# Patient Record
Sex: MALE | Race: WHITE | NOT HISPANIC OR LATINO | Employment: OTHER | URBAN - METROPOLITAN AREA
[De-identification: names, ages, dates, MRNs, and addresses within clinical notes are randomized per-mention and may not be internally consistent; named-entity substitution may affect disease eponyms.]

---
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
## 2022-02-09 ENCOUNTER — OFFICE VISIT (OUTPATIENT)
Dept: FAMILY MEDICINE CLINIC | Facility: CLINIC | Age: 22
End: 2022-02-09

## 2022-02-09 VITALS
OXYGEN SATURATION: 98 % | TEMPERATURE: 97.1 F | HEART RATE: 110 BPM | WEIGHT: 315 LBS | BODY MASS INDEX: 45.1 KG/M2 | HEIGHT: 70 IN | DIASTOLIC BLOOD PRESSURE: 80 MMHG | SYSTOLIC BLOOD PRESSURE: 120 MMHG

## 2022-02-09 DIAGNOSIS — F33.1 MODERATE EPISODE OF RECURRENT MAJOR DEPRESSIVE DISORDER: Primary | ICD-10-CM

## 2022-02-09 DIAGNOSIS — F43.10 PTSD (POST-TRAUMATIC STRESS DISORDER): ICD-10-CM

## 2022-02-09 DIAGNOSIS — F90.2 ATTENTION DEFICIT HYPERACTIVITY DISORDER (ADHD), COMBINED TYPE: ICD-10-CM

## 2022-02-09 DIAGNOSIS — F31.9 BIPOLAR I DISORDER WITH DEPRESSION: ICD-10-CM

## 2022-02-09 DIAGNOSIS — Z71.6 ENCOUNTER FOR SMOKING CESSATION COUNSELING: ICD-10-CM

## 2022-02-09 PROCEDURE — 99406 BEHAV CHNG SMOKING 3-10 MIN: CPT | Performed by: FAMILY MEDICINE

## 2022-02-09 PROCEDURE — 99214 OFFICE O/P EST MOD 30 MIN: CPT | Performed by: FAMILY MEDICINE

## 2022-02-09 RX ORDER — BUPROPION HYDROCHLORIDE 150 MG/1
150 TABLET ORAL DAILY
Qty: 30 TABLET | Refills: 5 | Status: SHIPPED | OUTPATIENT
Start: 2022-02-09 | End: 2022-07-08 | Stop reason: SDUPTHER

## 2022-02-09 RX ORDER — NICOTINE 21 MG/24HR
1 PATCH, TRANSDERMAL 24 HOURS TRANSDERMAL EVERY 24 HOURS
Qty: 28 PATCH | Refills: 0 | Status: SHIPPED | OUTPATIENT
Start: 2022-02-09 | End: 2022-04-06 | Stop reason: SDUPTHER

## 2022-02-09 NOTE — PROGRESS NOTES
"Toyin Workman is a 21 y.o. male who presents today for Manic Behavior (med refill )      Toyin reports that he has not been taking his Vreylar. He is currently out of his medication, and took his last dose 2-3 weeks ago.   He says that while he was taking it, it made him very fatigued and he would sleep for 15+ hours at a time; therefore, he is not able to say if it improved his symptoms because he spent most of the time sleeping.  Since being off his medication, he has been sleeping 6-8 hours on average.  He reports that he has been experiencing increased depression since running out of his medication.  He says he has felt \"down in the dumps\", has been preferring to isolate, and experiences anhedonia. He has taken multiple medications in the past and is not sure what all they were.  He states that he is currently searching for employment.  Patient reports that he has been on Seroquel in the past and says that it has helped with his symptoms without making him overly tired.  Denies SI/HI. Is inquiring about restarting his Adderall.  He reports that he has had difficulty focusing and completing tasks.  He has not taken ADHD medications for the past year since moving, but says he was on medications all through adolescence.  Noted that patient speech is slowed throughout the course of interview.       Review of Systems   Constitutional: Positive for activity change and fatigue. Negative for appetite change, unexpected weight gain and unexpected weight loss.   HENT: Positive for congestion. Negative for mouth sores, postnasal drip, rhinorrhea, sinus pressure and sore throat.    Eyes: Negative for blurred vision, double vision, pain and visual disturbance.   Respiratory: Negative for cough, chest tightness and shortness of breath.    Cardiovascular: Negative for chest pain and palpitations.   Gastrointestinal: Negative for abdominal pain, constipation, diarrhea, nausea and vomiting.   Neurological: Negative for dizziness, " syncope, speech difficulty, weakness, light-headedness, headache, memory problem and confusion.   Psychiatric/Behavioral: Positive for agitation, decreased concentration, sleep disturbance and depressed mood. Negative for behavioral problems, dysphoric mood, hallucinations, self-injury, suicidal ideas, negative for hyperactivity and stress. The patient is not nervous/anxious.       PHQ-2/PHQ-9 Depression Screening 2/9/2022   Little interest or pleasure in doing things 1   Feeling down, depressed, or hopeless 0   Trouble falling or staying asleep, or sleeping too much 0   Feeling tired or having little energy 1   Poor appetite or overeating 0   Feeling bad about yourself - or that you are a failure or have let yourself or your family down 0   Trouble concentrating on things, such as reading the newspaper or watching television 3   Moving or speaking so slowly that other people could have noticed. Or the opposite - being so fidgety or restless that you have been moving around a lot more than usual 0   Thoughts that you would be better off dead, or of hurting yourself in some way 0   Total Score 5   If you checked off any problems, how difficult have these problems made it for you to do your work, take care of things at home, or get along with other people? Somewhat difficult       The following portions of the patient's history were reviewed and updated as appropriate: allergies, current medications, past family history, past medical history, past social history, past surgical history and problem list.    Current Outpatient Medications on File Prior to Visit   Medication Sig Dispense Refill   • [DISCONTINUED] Cariprazine HCl (Vraylar) 3 MG capsule capsule Take 1 capsule by mouth Daily. 30 capsule 3     No current facility-administered medications on file prior to visit.       Allergies   Allergen Reactions   • Liquimat Lotion [Elemental Sulfur] Itching        Visit Vitals  /80   Pulse 110   Temp 97.1 °F (36.2  "°C)   Ht 179 cm (70.47\")   Wt (!) 146 kg (322 lb)   SpO2 98%   BMI 45.59 kg/m²        Physical Exam  Eyes:      General: No scleral icterus.     Extraocular Movements: Extraocular movements intact.      Conjunctiva/sclera: Conjunctivae normal.      Pupils: Pupils are equal, round, and reactive to light.   Cardiovascular:      Rate and Rhythm: Normal rate and regular rhythm.      Pulses: Normal pulses.      Heart sounds: Normal heart sounds. No murmur heard.  No friction rub. No gallop.    Pulmonary:      Effort: Pulmonary effort is normal. No respiratory distress.      Breath sounds: Normal breath sounds. No wheezing or rales.   Neurological:      General: No focal deficit present.      Mental Status: He is oriented to person, place, and time.   Psychiatric:         Mood and Affect: Mood normal.         Behavior: Behavior normal.         Thought Content: Thought content normal.         Judgment: Judgment normal.          No results found for this or any previous visit.     Problems Addressed this Visit        Mental Health    ADHD (attention deficit hyperactivity disorder)    Relevant Medications    buPROPion XL (WELLBUTRIN XL) 150 MG 24 hr tablet    nicotine (Nicoderm CQ) 21 MG/24HR patch    nicotine polacrilex (Nicorette) 2 MG gum    Other Relevant Orders    Ambulatory Referral to Behavioral Health    PTSD (post-traumatic stress disorder)    Relevant Medications    buPROPion XL (WELLBUTRIN XL) 150 MG 24 hr tablet    nicotine (Nicoderm CQ) 21 MG/24HR patch    nicotine polacrilex (Nicorette) 2 MG gum    Other Relevant Orders    Ambulatory Referral to Behavioral Health    Depression - Primary     Patient is bipolar and depression are chronic and poorly controlled.  Patient was taking Vraylar but had excessive sleepiness and fatigue secondary to the medication so this was discontinued.  Patient will be started on Wellbutrin which may help with depression, bipolar, ADHD, and smoking cessation.  Patient was also given " referral to behavioral health for further evaluation and treatment.  He was encouraged to set up his MyChart to complete behavioral health appointments for counseling and med management.  RTC/ED precautions given.         Relevant Medications    buPROPion XL (WELLBUTRIN XL) 150 MG 24 hr tablet    nicotine (Nicoderm CQ) 21 MG/24HR patch    nicotine polacrilex (Nicorette) 2 MG gum    Other Relevant Orders    Ambulatory Referral to Behavioral Health    Bipolar I disorder with depression (HCC)    Relevant Medications    buPROPion XL (WELLBUTRIN XL) 150 MG 24 hr tablet    nicotine (Nicoderm CQ) 21 MG/24HR patch    nicotine polacrilex (Nicorette) 2 MG gum    Other Relevant Orders    Ambulatory Referral to Behavioral Health       Tobacco    Encounter for smoking cessation counseling     Toyin Workman  reports that he has been smoking cigarettes. He has a 0.50 pack-year smoking history. He has never used smokeless tobacco.. I have educated him on the risk of diseases from using tobacco products such as cancer, COPD, heart disease and cataracts.     I advised him to quit and he is willing to quit. We have discussed the following method/s for tobacco cessation:  Counseling [place] OTC Cessation Products Prescription Medicaiton.  Together we have set a quit date for 1 month from today.  He will follow up with me in 2 months or sooner to check on his progress.    I spent 5.5 minutes counseling the patient.                Relevant Medications    nicotine (Nicoderm CQ) 21 MG/24HR patch    nicotine polacrilex (Nicorette) 2 MG gum      Diagnoses       Codes Comments    Moderate episode of recurrent major depressive disorder (HCC)    -  Primary ICD-10-CM: F33.1  ICD-9-CM: 296.32     Encounter for smoking cessation counseling     ICD-10-CM: Z71.6  ICD-9-CM: V65.42, 305.1     Attention deficit hyperactivity disorder (ADHD), combined type     ICD-10-CM: F90.2  ICD-9-CM: 314.01     PTSD (post-traumatic stress disorder)     ICD-10-CM:  F43.10  ICD-9-CM: 309.81     Bipolar I disorder with depression (HCC)     ICD-10-CM: F31.9  ICD-9-CM: 296.50           Return in about 8 weeks (around 4/6/2022) for Follow-up ADHD, bipolar depression.    I attest that I have reviewed the student note and that the components of the history of the present illness, the physical exam, and the assessment and plan documented were performed by me or were performed in my presence by the student and verified by me.    Ruben Sharma MD   2/13/2022

## 2022-02-13 PROBLEM — Z71.6 ENCOUNTER FOR SMOKING CESSATION COUNSELING: Status: ACTIVE | Noted: 2022-02-13

## 2022-02-13 NOTE — ASSESSMENT & PLAN NOTE
Toyin Workman  reports that he has been smoking cigarettes. He has a 0.50 pack-year smoking history. He has never used smokeless tobacco.. I have educated him on the risk of diseases from using tobacco products such as cancer, COPD, heart disease and cataracts.     I advised him to quit and he is willing to quit. We have discussed the following method/s for tobacco cessation:  Counseling [place] OTC Cessation Products Prescription Medicaiton.  Together we have set a quit date for 1 month from today.  He will follow up with me in 2 months or sooner to check on his progress.    I spent 5.5 minutes counseling the patient.

## 2022-02-13 NOTE — ASSESSMENT & PLAN NOTE
Patient is bipolar and depression are chronic and poorly controlled.  Patient was taking Vraylar but had excessive sleepiness and fatigue secondary to the medication so this was discontinued.  Patient will be started on Wellbutrin which may help with depression, bipolar, ADHD, and smoking cessation.  Patient was also given referral to behavioral health for further evaluation and treatment.  He was encouraged to set up his MyChart to complete behavioral health appointments for counseling and med management.  RTC/ED precautions given.

## 2022-04-01 ENCOUNTER — TELEPHONE (OUTPATIENT)
Dept: FAMILY MEDICINE CLINIC | Facility: CLINIC | Age: 22
End: 2022-04-01

## 2022-04-01 NOTE — TELEPHONE ENCOUNTER
LVM for pt mother to call back      OKAY FOR HUB TO READY  We do not have any opening today, pt could try [facility]

## 2022-04-01 NOTE — TELEPHONE ENCOUNTER
Caller: jacqui kumari    Relationship: Mother    Best call back number: [phone]     What was the call regarding: PATIENT'S MOTHER CALLED TO MAKE AN APPOINTMENT.  PATIENT IS HAVING CONGESTION, SORE THROAT AND CHEST PAIN, NOT LIFE THREATENING.  SHE WOULD LIKE THE PATIENT TO BE SEEN TODAY.    Do you require a callback: YES

## 2022-04-06 ENCOUNTER — OFFICE VISIT (OUTPATIENT)
Dept: FAMILY MEDICINE CLINIC | Facility: CLINIC | Age: 22
End: 2022-04-06

## 2022-04-06 VITALS
TEMPERATURE: 98.7 F | HEART RATE: 118 BPM | OXYGEN SATURATION: 98 % | SYSTOLIC BLOOD PRESSURE: 130 MMHG | BODY MASS INDEX: 42.66 KG/M2 | HEIGHT: 72 IN | DIASTOLIC BLOOD PRESSURE: 80 MMHG | WEIGHT: 315 LBS

## 2022-04-06 DIAGNOSIS — Z71.6 ENCOUNTER FOR SMOKING CESSATION COUNSELING: ICD-10-CM

## 2022-04-06 DIAGNOSIS — F51.04 PSYCHOPHYSIOLOGICAL INSOMNIA: ICD-10-CM

## 2022-04-06 DIAGNOSIS — F33.41 RECURRENT MAJOR DEPRESSIVE DISORDER, IN PARTIAL REMISSION: ICD-10-CM

## 2022-04-06 DIAGNOSIS — F90.2 ATTENTION DEFICIT HYPERACTIVITY DISORDER (ADHD), COMBINED TYPE: ICD-10-CM

## 2022-04-06 DIAGNOSIS — F31.9 BIPOLAR I DISORDER WITH DEPRESSION: Primary | ICD-10-CM

## 2022-04-06 PROCEDURE — 99214 OFFICE O/P EST MOD 30 MIN: CPT | Performed by: FAMILY MEDICINE

## 2022-04-06 RX ORDER — HYDROXYZINE HYDROCHLORIDE 25 MG/1
25 TABLET, FILM COATED ORAL
Qty: 30 TABLET | Refills: 5 | Status: SHIPPED | OUTPATIENT
Start: 2022-04-06 | End: 2022-07-08 | Stop reason: SDUPTHER

## 2022-04-06 RX ORDER — NICOTINE 21 MG/24HR
1 PATCH, TRANSDERMAL 24 HOURS TRANSDERMAL EVERY 24 HOURS
Qty: 28 PATCH | Refills: 0 | Status: SHIPPED | OUTPATIENT
Start: 2022-04-06 | End: 2022-07-08

## 2022-04-06 NOTE — ASSESSMENT & PLAN NOTE
Toyin Workman  reports that he has been smoking cigarettes. He has a 0.50 pack-year smoking history. He has never used smokeless tobacco.. I have educated him on the risk of diseases from using tobacco products such as cancer, COPD, heart disease and cataracts.     I advised him to quit and he is willing to quit. We have discussed the following method/s for tobacco cessation:  Counseling OTC Cessation Products.  Together we have set a quit date for 1 month from today.  He will follow up with me in 3 months or sooner to check on his progress.    I spent 5 minutes counseling the patient.

## 2022-04-06 NOTE — ASSESSMENT & PLAN NOTE
Psychological condition is improving with treatment.  Continue current treatment regimen.  Referral to psychological counseling.  Referral to psychiatry.  Patient did not schedule appointment with behavioral health because he cannot set up his MyChart and we had the wrong phone number in his chart.  Patient will stop at the  incorrect phone number in the chart and get information to have his MyChart set up.  Psychological condition  will be reassessed in 3 months.

## 2022-04-06 NOTE — PROGRESS NOTES
Toyin Workman is a 21 y.o. male who presents today for Manic Behavior (F/u)      Patient is here for follow up on ADHD and depression. Patient is still taking the bupropion daily with no missed doses. He has not been contacted by the mental behavior health. He states they have been contacting his mom's phone instead of his number. Patient tried to call them back but he kept getting voicemail. He says his number [phone]. Patient's PHQ-2: 0. Patient states he is doing well on the bupropion. He states it has helped with the depression. It does not help with concentration but he has been completing daily tasks. Patient states he has issues with sleep. He has issues falling asleep and staying asleep. A few weeks ago, he was up for 2 days because he wasn't tired and then he will sleep 12+ hours after. He states his fatigue and irritability has improved. He denies SI/HI.    Patient is still smoking cigarettes and is using the nicotine patch daily. It does help with the cravings. He stopped smoking for 4-5 days. Patient is smoking 4-5 cigarettes daily.     PHQ-2/PHQ-9 Depression Screening 4/6/2022   Retired PHQ-9 Total Score -   Retired Total Score -   Little Interest or Pleasure in Doing Things 0-->not at all   Feeling Down, Depressed or Hopeless 0-->not at all   PHQ-9: Brief Depression Severity Measure Score 0       Review of Systems   Constitutional: Negative for diaphoresis and fever.   Respiratory: Negative for shortness of breath.    Cardiovascular: Negative for chest pain and palpitations.   Gastrointestinal: Negative for abdominal pain, blood in stool, constipation, diarrhea, nausea and vomiting.   Neurological: Negative for headache.   Psychiatric/Behavioral: Positive for agitation (mild and improving), decreased concentration and sleep disturbance. Negative for hallucinations, self-injury, suicidal ideas and depressed mood. The patient is not nervous/anxious.         The following portions of the patient's  "history were reviewed and updated as appropriate: allergies, current medications, past family history, past medical history, past social history, past surgical history and problem list.    Current Outpatient Medications on File Prior to Visit   Medication Sig Dispense Refill   • buPROPion XL (WELLBUTRIN XL) 150 MG 24 hr tablet Take 1 tablet by mouth Daily. 30 tablet 5   • [DISCONTINUED] nicotine (Nicoderm CQ) 21 MG/24HR patch Place 1 patch on the skin as directed by provider Daily. 28 patch 0   • [DISCONTINUED] nicotine polacrilex (Nicorette) 2 MG gum Chew 1 each As Needed for Smoking Cessation (every 2 hours as needed). 100 each 3     No current facility-administered medications on file prior to visit.       Allergies   Allergen Reactions   • Liquimat Lotion [Elemental Sulfur] Itching        Visit Vitals  /80   Pulse 118   Temp 98.7 °F (37.1 °C)   Ht 182.9 cm (72\")   Wt (!) 147 kg (325 lb)   SpO2 98%   BMI 44.08 kg/m²        Physical Exam  Constitutional:       Appearance: Normal appearance.   Cardiovascular:      Rate and Rhythm: Normal rate and regular rhythm.      Pulses: Normal pulses.      Heart sounds: Normal heart sounds. No murmur heard.    No friction rub. No gallop.   Pulmonary:      Effort: Pulmonary effort is normal. No respiratory distress.      Breath sounds: Normal breath sounds. No stridor. No wheezing, rhonchi or rales.   Chest:      Chest wall: No tenderness.   Skin:     General: Skin is warm and dry.   Neurological:      Mental Status: He is alert and oriented to person, place, and time.   Psychiatric:         Mood and Affect: Mood normal.         Behavior: Behavior normal.          No results found for this or any previous visit.     Problems Addressed this Visit        Mental Health    ADHD (attention deficit hyperactivity disorder)    Relevant Medications    hydrOXYzine (ATARAX) 25 MG tablet    nicotine (Nicoderm CQ) 21 MG/24HR patch    Other Relevant Orders    Ambulatory Referral to " Behavioral Health    Depression    Relevant Medications    hydrOXYzine (ATARAX) 25 MG tablet    nicotine (Nicoderm CQ) 21 MG/24HR patch    Other Relevant Orders    Ambulatory Referral to Behavioral Health    Bipolar I disorder with depression (HCC) - Primary     Psychological condition is improving with treatment.  Continue current treatment regimen.  Referral to psychological counseling.  Referral to psychiatry.  Patient did not schedule appointment with behavioral health because he cannot set up his MyChart and we had the wrong phone number in his chart.  Patient will stop at the  incorrect phone number in the chart and get information to have his MyChart set up.  Psychological condition  will be reassessed in 3 months.           Relevant Medications    hydrOXYzine (ATARAX) 25 MG tablet    nicotine (Nicoderm CQ) 21 MG/24HR patch    Other Relevant Orders    Ambulatory Referral to Behavioral Health       Sleep    Psychophysiological insomnia     Patient has had some difficulty sleeping as he does not feel sleepy and has racing thoughts when he tries to go to bed.  Patient has not had manic behavior that goes along with the sleep difficulty.  Patient has referral to behavioral health pending.  Patient will try hydroxyzine nightly for sleep.           Relevant Medications    hydrOXYzine (ATARAX) 25 MG tablet       Tobacco    Encounter for smoking cessation counseling     Toyin Workman  reports that he has been smoking cigarettes. He has a 0.50 pack-year smoking history. He has never used smokeless tobacco.. I have educated him on the risk of diseases from using tobacco products such as cancer, COPD, heart disease and cataracts.     I advised him to quit and he is willing to quit. We have discussed the following method/s for tobacco cessation:  Counseling OTC Cessation Products.  Together we have set a quit date for 1 month from today.  He will follow up with me in 3 months or sooner to check on his  progress.    I spent 5 minutes counseling the patient.                Relevant Medications    nicotine (Nicoderm CQ) 21 MG/24HR patch      Diagnoses       Codes Comments    Bipolar I disorder with depression (HCC)    -  Primary ICD-10-CM: F31.9  ICD-9-CM: 296.50     Attention deficit hyperactivity disorder (ADHD), combined type     ICD-10-CM: F90.2  ICD-9-CM: 314.01     Recurrent major depressive disorder, in partial remission (HCC)     ICD-10-CM: F33.41  ICD-9-CM: 296.35     Encounter for smoking cessation counseling     ICD-10-CM: Z71.6  ICD-9-CM: V65.42, 305.1     Psychophysiological insomnia     ICD-10-CM: F51.04  ICD-9-CM: 307.42           Return in about 3 months (around 7/6/2022) for Follow-up mental health and smoking.    I attest that I have reviewed the student note and that the components of the history of the present illness, the physical exam, and the assessment and plan documented were performed by me or were performed in my presence by the student and verified by me.    Ruben Sharma MD   4/6/2022

## 2022-04-06 NOTE — ASSESSMENT & PLAN NOTE
Patient has had some difficulty sleeping as he does not feel sleepy and has racing thoughts when he tries to go to bed.  Patient has not had manic behavior that goes along with the sleep difficulty.  Patient has referral to behavioral health pending.  Patient will try hydroxyzine nightly for sleep.

## 2022-07-08 ENCOUNTER — OFFICE VISIT (OUTPATIENT)
Dept: FAMILY MEDICINE CLINIC | Facility: CLINIC | Age: 22
End: 2022-07-08

## 2022-07-08 VITALS
DIASTOLIC BLOOD PRESSURE: 68 MMHG | HEART RATE: 80 BPM | WEIGHT: 315 LBS | TEMPERATURE: 98.6 F | HEIGHT: 70 IN | OXYGEN SATURATION: 98 % | BODY MASS INDEX: 45.1 KG/M2 | SYSTOLIC BLOOD PRESSURE: 116 MMHG

## 2022-07-08 DIAGNOSIS — F31.9 BIPOLAR I DISORDER WITH DEPRESSION: Primary | ICD-10-CM

## 2022-07-08 DIAGNOSIS — Z71.6 ENCOUNTER FOR SMOKING CESSATION COUNSELING: ICD-10-CM

## 2022-07-08 DIAGNOSIS — F51.04 PSYCHOPHYSIOLOGICAL INSOMNIA: ICD-10-CM

## 2022-07-08 DIAGNOSIS — F33.1 MODERATE EPISODE OF RECURRENT MAJOR DEPRESSIVE DISORDER: ICD-10-CM

## 2022-07-08 DIAGNOSIS — F90.2 ATTENTION DEFICIT HYPERACTIVITY DISORDER (ADHD), COMBINED TYPE: ICD-10-CM

## 2022-07-08 PROCEDURE — 99214 OFFICE O/P EST MOD 30 MIN: CPT | Performed by: FAMILY MEDICINE

## 2022-07-08 PROCEDURE — 99406 BEHAV CHNG SMOKING 3-10 MIN: CPT | Performed by: FAMILY MEDICINE

## 2022-07-08 RX ORDER — BUPROPION HYDROCHLORIDE 150 MG/1
150 TABLET ORAL DAILY
Qty: 30 TABLET | Refills: 5 | Status: SHIPPED | OUTPATIENT
Start: 2022-07-08 | End: 2022-12-07 | Stop reason: SDUPTHER

## 2022-07-08 RX ORDER — HYDROXYZINE HYDROCHLORIDE 25 MG/1
25 TABLET, FILM COATED ORAL
Qty: 30 TABLET | Refills: 5 | Status: SHIPPED | OUTPATIENT
Start: 2022-07-08 | End: 2022-12-07

## 2022-07-08 RX ORDER — NICOTINE 21 MG/24HR
1 PATCH, TRANSDERMAL 24 HOURS TRANSDERMAL EVERY 24 HOURS
Qty: 28 PATCH | Refills: 2 | Status: SHIPPED | OUTPATIENT
Start: 2022-07-08 | End: 2022-12-07

## 2022-07-08 NOTE — ASSESSMENT & PLAN NOTE
Sleep is improved significantly with hydroxyzine nightly.  Patient will continue this medication at this time.

## 2022-07-08 NOTE — ASSESSMENT & PLAN NOTE
Toyin Workman  reports that he has been smoking cigarettes. He has a 0.50 pack-year smoking history. He has never used smokeless tobacco.. I have educated him on the risk of diseases from using tobacco products such as cancer, COPD, heart disease and cataracts.     I advised him to quit and he is willing to quit. We have discussed the following method/s for tobacco cessation:  Education Material Counseling OTC Cessation Products.  Together we have set a quit date for 1 month from today.  He will follow up with me in 3 months or sooner to check on his progress.    I spent 5.5 minutes counseling the patient.

## 2022-07-08 NOTE — PROGRESS NOTES
Toyin Workman is a 22 y.o. male who presents today for Manic Behavior (F/u)      Patient has been taking wellbutrin as directed and has seen improvement in mood. He has not had further manic episodes. He has been using hydroxyzine for sleep which has helped significantly. He gets between 8-10 hours of sleep a night. He has appointment with behavioral health on 22. He needs paper for the  office stating he is a patient here and has his name and  on it. He continues to smoke in stressfeul situations and has been smoking about 8-10 cigarettes a day. He is using nicotine patches using a 21mg patch daily. He is not using nicotine gum due to the taste.        Review of Systems   Constitutional: Negative for fever and unexpected weight loss.   HENT: Negative for congestion, ear pain and sore throat.    Eyes: Negative for visual disturbance.   Respiratory: Negative for cough, shortness of breath and wheezing.    Cardiovascular: Negative for chest pain and palpitations.   Gastrointestinal: Negative for abdominal pain, blood in stool, constipation, diarrhea, nausea, vomiting and GERD.   Endocrine: Negative for polydipsia and polyuria.   Genitourinary: Negative for difficulty urinating.   Musculoskeletal: Negative for joint swelling.   Skin: Negative for rash and skin lesions.   Allergic/Immunologic: Negative for environmental allergies.   Neurological: Negative for seizures and syncope.   Hematological: Does not bruise/bleed easily.   Psychiatric/Behavioral: Negative for decreased concentration, dysphoric mood, sleep disturbance, suicidal ideas and depressed mood. The patient is not nervous/anxious.         The following portions of the patient's history were reviewed and updated as appropriate: allergies, current medications, past family history, past medical history, past social history, past surgical history and problem list.    Current Outpatient Medications on File Prior to Visit   Medication Sig Dispense Refill  "  • [DISCONTINUED] buPROPion XL (WELLBUTRIN XL) 150 MG 24 hr tablet Take 1 tablet by mouth Daily. 30 tablet 5   • [DISCONTINUED] hydrOXYzine (ATARAX) 25 MG tablet Take 1 tablet by mouth every night at bedtime. 30 tablet 5   • [DISCONTINUED] nicotine (Nicoderm CQ) 21 MG/24HR patch Place 1 patch on the skin as directed by provider Daily. 28 patch 0     No current facility-administered medications on file prior to visit.       Allergies   Allergen Reactions   • Liquimat Lotion [Elemental Sulfur] Itching        Visit Vitals  /68   Pulse 80   Temp 98.6 °F (37 °C)   Ht 179 cm (70.47\")   Wt (!) 143 kg (316 lb)   SpO2 98%   BMI 44.74 kg/m²        Physical Exam  Constitutional:       General: He is not in acute distress.     Appearance: He is well-developed. He is not diaphoretic.   HENT:      Head: Atraumatic.   Cardiovascular:      Rate and Rhythm: Normal rate and regular rhythm.      Heart sounds: Normal heart sounds. No murmur heard.    No friction rub. No gallop.   Pulmonary:      Effort: Pulmonary effort is normal. No respiratory distress.      Breath sounds: Normal breath sounds. No stridor. No wheezing, rhonchi or rales.   Musculoskeletal:      Cervical back: Normal range of motion and neck supple.   Skin:     General: Skin is warm and dry.   Neurological:      Mental Status: He is alert and oriented to person, place, and time.   Psychiatric:         Behavior: Behavior normal.          No results found for this or any previous visit.     Problems Addressed this Visit        Mental Health    ADHD (attention deficit hyperactivity disorder)    Relevant Medications    buPROPion XL (WELLBUTRIN XL) 150 MG 24 hr tablet    hydrOXYzine (ATARAX) 25 MG tablet    nicotine (Nicoderm CQ) 14 MG/24HR patch    Depression    Relevant Medications    buPROPion XL (WELLBUTRIN XL) 150 MG 24 hr tablet    hydrOXYzine (ATARAX) 25 MG tablet    nicotine (Nicoderm CQ) 14 MG/24HR patch    Bipolar I disorder with depression (HCC) - Primary "     Psychological condition is improving with treatment.  Continue current treatment regimen.  Patient was encouraged to keep his behavioral health appointment in August.  Psychological condition  will be reassessed in 3 months.           Relevant Medications    buPROPion XL (WELLBUTRIN XL) 150 MG 24 hr tablet    hydrOXYzine (ATARAX) 25 MG tablet    nicotine (Nicoderm CQ) 14 MG/24HR patch       Sleep    Psychophysiological insomnia     Sleep is improved significantly with hydroxyzine nightly.  Patient will continue this medication at this time.           Relevant Medications    hydrOXYzine (ATARAX) 25 MG tablet       Tobacco    Encounter for smoking cessation counseling     Toyin Workman  reports that he has been smoking cigarettes. He has a 0.50 pack-year smoking history. He has never used smokeless tobacco.. I have educated him on the risk of diseases from using tobacco products such as cancer, COPD, heart disease and cataracts.     I advised him to quit and he is willing to quit. We have discussed the following method/s for tobacco cessation:  Education Material Counseling OTC Cessation Products.  Together we have set a quit date for 1 month from today.  He will follow up with me in 3 months or sooner to check on his progress.    I spent 5.5 minutes counseling the patient.                  Relevant Medications    nicotine (Nicoderm CQ) 14 MG/24HR patch      Diagnoses       Codes Comments    Bipolar I disorder with depression (HCC)    -  Primary ICD-10-CM: F31.9  ICD-9-CM: 296.50     Attention deficit hyperactivity disorder (ADHD), combined type     ICD-10-CM: F90.2  ICD-9-CM: 314.01     Moderate episode of recurrent major depressive disorder (HCC)     ICD-10-CM: F33.1  ICD-9-CM: 296.32     Psychophysiological insomnia     ICD-10-CM: F51.04  ICD-9-CM: 307.42     Encounter for smoking cessation counseling     ICD-10-CM: Z71.6  ICD-9-CM: V65.42, 305.1           Return in about 3 months (around 10/8/2022) for  Annual.    Ruben Sharma MD   7/8/2022

## 2022-07-08 NOTE — ASSESSMENT & PLAN NOTE
Psychological condition is improving with treatment.  Continue current treatment regimen.  Patient was encouraged to keep his behavioral health appointment in August.  Psychological condition  will be reassessed in 3 months.

## 2022-07-08 NOTE — PATIENT INSTRUCTIONS
Smoking Tobacco Information, Adult  Smoking tobacco can be harmful to your health. Tobacco contains a poisonous (toxic), colorless chemical called nicotine. Nicotine is addictive. It changes the brain and can make it hard to stop smoking. Tobacco also has other toxic chemicals that can hurt your body and raise your risk of many cancers.  How can smoking tobacco affect me?  Smoking tobacco puts you at risk for:  Cancer. Smoking is most commonly associated with lung cancer, but can also lead to cancer in other parts of the body.  Chronic obstructive pulmonary disease (COPD). This is a long-term lung condition that makes it hard to breathe. It also gets worse over time.  High blood pressure (hypertension), heart disease, stroke, or heart attack.  Lung infections, such as pneumonia.  Cataracts. This is when the lenses in the eyes become clouded.  Digestive problems. This may include peptic ulcers, heartburn, and gastroesophageal reflux disease (GERD).  Oral health problems, such as gum disease and tooth loss.  Loss of taste and smell.  Smoking can affect your appearance by causing:  Wrinkles.  Yellow or stained teeth, fingers, and fingernails.  Smoking tobacco can also affect your social life, because:  It may be challenging to find places to smoke when away from home. Many workplaces, restaurants, hotels, and public places are tobacco-free.  Smoking is expensive. This is due to the cost of tobacco and the long-term costs of treating health problems from smoking.  Secondhand smoke may affect those around you. Secondhand smoke can cause lung cancer, breathing problems, and heart disease. Children of smokers have a higher risk for:  Sudden infant death syndrome (SIDS).  Ear infections.  Lung infections.  If you currently smoke tobacco, quitting now can help you:  Lead a longer and healthier life.  Look, smell, breathe, and feel better over time.  Save money.  Protect others from the harms of secondhand smoke.  What  actions can I take to prevent health problems?  Quit smoking    Do not start smoking. Quit if you already do.  Make a plan to quit smoking and commit to it. Look for programs to help you and ask your health care provider for recommendations and ideas.  Set a date and write down all the reasons you want to quit.  Let your friends and family know you are quitting so they can help and support you. Consider finding friends who also want to quit. It can be easier to quit with someone else, so that you can support each other.  Talk with your health care provider about using nicotine replacement medicines to help you quit, such as gum, lozenges, patches, sprays, or pills.  Do not replace cigarette smoking with electronic cigarettes, which are commonly called e-cigarettes. The safety of e-cigarettes is not known, and some may contain harmful chemicals.  If you try to quit but return to smoking, stay positive. It is common to slip up when you first quit, so take it one day at a time.  Be prepared for cravings. When you feel the urge to smoke, chew gum or suck on hard candy.    Lifestyle  Stay busy and take care of your body.  Drink enough fluid to keep your urine pale yellow.  Get plenty of exercise and eat a healthy diet. This can help prevent weight gain after quitting.  Monitor your eating habits. Quitting smoking can cause you to have a larger appetite than when you smoke.  Find ways to relax. Go out with friends or family to a movie or a restaurant where people do not smoke.  Ask your health care provider about having regular tests (screenings) to check for cancer. This may include blood tests, imaging tests, and other tests.  Find ways to manage your stress, such as meditation, yoga, or exercise.  Where to find support  To get support to quit smoking, consider:  Asking your health care provider for more information and resources.  Taking classes to learn more about quitting smoking.  Looking for local organizations  that offer resources about quitting smoking.  Joining a support group for people who want to quit smoking in your local community.  Calling the smokefree.gov counselor helpline: 1-800-Quit-Now (1-703.289.1270)  Where to find more information  You may find more information about quitting smoking from:  HelpGuide.org: www.helpguide.org  Smokefree.gov: smokefree.gov  American Lung Association: www.lung.org  Contact a health care provider if you:  Have problems breathing.  Notice that your lips, nose, or fingers turn blue.  Have chest pain.  Are coughing up blood.  Feel faint or you pass out.  Have other health changes that cause you to worry.  Summary  Smoking tobacco can negatively affect your health, the health of those around you, your finances, and your social life.  Do not start smoking. Quit if you already do. If you need help quitting, ask your health care provider.  Think about joining a support group for people who want to quit smoking in your local community. There are many effective programs that will help you to quit this behavior.  This information is not intended to replace advice given to you by your health care provider. Make sure you discuss any questions you have with your health care provider.  Document Revised: 09/11/2020 Document Reviewed: 01/02/2018  Elsevier Patient Education © 2021 [employer]

## 2022-10-11 ENCOUNTER — TELEPHONE (OUTPATIENT)
Dept: FAMILY MEDICINE CLINIC | Facility: CLINIC | Age: 22
End: 2022-10-11

## 2022-10-11 NOTE — TELEPHONE ENCOUNTER
Hub to read    Attempted to call pt at both #s no answer, left vm on [phone] area code about no show policy. [phone] # not a valid #. Letter sent out

## 2022-10-13 ENCOUNTER — TELEMEDICINE (OUTPATIENT)
Dept: PSYCHIATRY | Facility: CLINIC | Age: 22
End: 2022-10-13

## 2022-10-13 DIAGNOSIS — F51.04 PSYCHOPHYSIOLOGICAL INSOMNIA: ICD-10-CM

## 2022-10-13 DIAGNOSIS — F43.10 PTSD (POST-TRAUMATIC STRESS DISORDER): ICD-10-CM

## 2022-10-13 DIAGNOSIS — F90.2 ATTENTION DEFICIT HYPERACTIVITY DISORDER (ADHD), COMBINED TYPE: Primary | ICD-10-CM

## 2022-10-13 DIAGNOSIS — F31.9 BIPOLAR I DISORDER WITH DEPRESSION: ICD-10-CM

## 2022-10-13 PROCEDURE — 90792 PSYCH DIAG EVAL W/MED SRVCS: CPT | Performed by: NURSE PRACTITIONER
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
## 2022-12-07 ENCOUNTER — TELEMEDICINE (OUTPATIENT)
Dept: PSYCHIATRY | Facility: CLINIC | Age: 22
End: 2022-12-07

## 2022-12-07 DIAGNOSIS — F90.2 ATTENTION DEFICIT HYPERACTIVITY DISORDER (ADHD), COMBINED TYPE: ICD-10-CM

## 2022-12-07 DIAGNOSIS — F33.1 MODERATE EPISODE OF RECURRENT MAJOR DEPRESSIVE DISORDER: ICD-10-CM

## 2022-12-07 DIAGNOSIS — F51.04 PSYCHOPHYSIOLOGICAL INSOMNIA: ICD-10-CM

## 2022-12-07 DIAGNOSIS — F31.9 BIPOLAR I DISORDER WITH DEPRESSION: Primary | ICD-10-CM

## 2022-12-07 DIAGNOSIS — F43.10 PTSD (POST-TRAUMATIC STRESS DISORDER): ICD-10-CM

## 2022-12-07 PROCEDURE — 99214 OFFICE O/P EST MOD 30 MIN: CPT | Performed by: NURSE PRACTITIONER

## 2022-12-07 RX ORDER — DOXEPIN HYDROCHLORIDE 10 MG/1
10 CAPSULE ORAL NIGHTLY
Qty: 30 CAPSULE | Refills: 3 | Status: SHIPPED | OUTPATIENT
Start: 2022-12-07 | End: 2023-01-11 | Stop reason: SDUPTHER

## 2022-12-07 RX ORDER — BUPROPION HYDROCHLORIDE 150 MG/1
150 TABLET ORAL DAILY
Qty: 30 TABLET | Refills: 6 | Status: SHIPPED | OUTPATIENT
Start: 2022-12-07

## 2022-12-07 NOTE — PROGRESS NOTES
Patient Name: Toyin Workman  MRN: [id number]   :  2000     This provider is located at [facility] [address], [address] using a secure [employer] Video Visit through [employer]. Patient is being seen remotely via telehealth at their home address in [place], and stated they are in a secure environment for this session. The patient's condition being diagnosed/treated is appropriate for telemedicine. The provider identified herself as well as her credentials.   The patient, and/or patients guardian, consent to be seen remotely, and when consent is given they understand that the consent allows for patient identifiable information to be sent to a third party as needed.   They may refuse to be seen remotely at any time. The electronic data is encrypted and password protected, and the patient and/or guardian has been advised of the potential risks to privacy not withstanding such measures.    You have chosen to receive care through a telehealth visit.  Do you consent to use a video/audio connection for your medical care today? Yes    Chief Complaint:      ICD-10-CM ICD-9-CM   1. Bipolar I disorder with depression (HCC)  F31.9 296.50   2. PTSD (post-traumatic stress disorder)  F43.10 309.81   3. Psychophysiological insomnia  F51.04 307.42   4. Attention deficit hyperactivity disorder (ADHD), combined type  F90.2 314.01   5. Moderate episode of recurrent major depressive disorder (HCC)  F33.1 296.32       History of Present Illness: Toyin Workman is a 22 y.o. male is here today for medication management follow up.  Patient states his mood is much better.  It is more even.  Patient states his mother agrees.  Patient's mother states he is not as irritable and angry.  States the only issue is difficulty staying asleep.  States if he wakes up in the middle of the night then he is up and cannot go back to sleep.    The following portions of the  patient's history were reviewed and updated as appropriate: allergies, current medications, past family history, past medical history, past social history, past surgical history and problem list.    Review of Systems;;  Review of Systems   Constitutional: Negative for activity change, appetite change, fatigue, unexpected weight gain and unexpected weight loss.   Respiratory: Negative for shortness of breath and wheezing.    Gastrointestinal: Negative for constipation, diarrhea, nausea and vomiting.   Musculoskeletal: Negative for gait problem.   Skin: Negative for dry skin and rash.   Neurological: Negative for dizziness, speech difficulty, weakness, light-headedness, headache, memory problem and confusion.   Psychiatric/Behavioral: Positive for sleep disturbance. Negative for agitation, behavioral problems, decreased concentration, dysphoric mood, hallucinations, self-injury, suicidal ideas, negative for hyperactivity, depressed mood and stress. The patient is not nervous/anxious.        Physical Exam;;  Physical Exam  Constitutional:       General: He is not in acute distress.     Appearance: He is well-developed. He is not diaphoretic.   HENT:      Head: Normocephalic and atraumatic.   Eyes:      Conjunctiva/sclera: Conjunctivae normal.   Pulmonary:      Effort: Pulmonary effort is normal. No respiratory distress.   Musculoskeletal:         General: Normal range of motion.      Cervical back: Full passive range of motion without pain and normal range of motion.   Neurological:      Mental Status: He is alert and oriented to person, place, and time.   Psychiatric:         Mood and Affect: Mood is not anxious or depressed. Affect is not labile, blunt, angry or inappropriate.         Speech: Speech is not rapid and pressured or tangential.         Behavior: Behavior normal. Behavior is not agitated, slowed, aggressive, withdrawn, hyperactive or combative. Behavior is cooperative.         Thought Content: Thought  content normal. Thought content is not paranoid or delusional. Thought content does not include homicidal or suicidal ideation. Thought content does not include homicidal or suicidal plan.         Judgment: Judgment normal.       There were no vitals taken for this visit.  There is no height or weight on file to calculate BMI. Video appt unable to obtain.     Current Medications;;    Current Outpatient Medications:   •  buPROPion XL (WELLBUTRIN XL) 150 MG 24 hr tablet, Take 1 tablet by mouth Daily., Disp: 30 tablet, Rfl: 6  •  Cariprazine HCl (Vraylar) 3 MG capsule capsule, Take 1 capsule by mouth Daily., Disp: 30 capsule, Rfl: 6  •  doxepin (SINEquan) 10 MG capsule, Take 1 capsule by mouth Every Night., Disp: 30 capsule, Rfl: 3    Lab Results:   No visits with results within 3 Month(s) from this visit.   Latest known visit with results is:   No results found for any previous visit.       Mental Status Exam:   Hygiene:   good  Cooperation:  Cooperative  Eye Contact:  Good  Psychomotor Behavior:  Appropriate  Mood:within normal limits  Affect:  Appropriate  Hopelessness: Denies  Speech:  Normal  Thought Process:  Goal directed  Thought Content:  Normal  Suicidal:  None  Homicidal:  None  Hallucinations:  None  Delusion:  None  Memory:  Intact  Orientation:  Person, Place, Time and Situation  Reliability:  good  Insight:  Good  Judgement:  Good  Impulse Control:  Good  Physical/Medical Issues:  No     PHQ-9 Depression Screening  Little interest or pleasure in doing things?     Feeling down, depressed, or hopeless?     Trouble falling or staying asleep, or sleeping too much?     Feeling tired or having little energy?     Poor appetite or overeating?     Feeling bad about yourself - or that you are a failure or have let yourself or your family down?     Trouble concentrating on things, such as reading the newspaper or watching television?     Moving or speaking so slowly that other people could have noticed? Or the  opposite - being so fidgety or restless that you have been moving around a lot more than usual?     Thoughts that you would be better off dead, or of hurting yourself in some way?     PHQ-9 Total Score     If you checked off any problems, how difficult have these problems made it for you to do your work, take care of things at home, or get along with other people?          Assessment/Plan:  Diagnoses and all orders for this visit:    1. Bipolar I disorder with depression (HCC) (Primary)  -     buPROPion XL (WELLBUTRIN XL) 150 MG 24 hr tablet; Take 1 tablet by mouth Daily.  Dispense: 30 tablet; Refill: 6  -     Cariprazine HCl (Vraylar) 3 MG capsule capsule; Take 1 capsule by mouth Daily.  Dispense: 30 capsule; Refill: 6    2. PTSD (post-traumatic stress disorder)    3. Psychophysiological insomnia    4. Attention deficit hyperactivity disorder (ADHD), combined type  -     buPROPion XL (WELLBUTRIN XL) 150 MG 24 hr tablet; Take 1 tablet by mouth Daily.  Dispense: 30 tablet; Refill: 6    5. Moderate episode of recurrent major depressive disorder (HCC)  -     buPROPion XL (WELLBUTRIN XL) 150 MG 24 hr tablet; Take 1 tablet by mouth Daily.  Dispense: 30 tablet; Refill: 6    Other orders  -     doxepin (SINEquan) 10 MG capsule; Take 1 capsule by mouth Every Night.  Dispense: 30 capsule; Refill: 3      Patient's mood seems to be stable.  We will add doxepin 10 mg at night for sleep and assistance in staying asleep.    A psychological evaluation was conducted in order to assess past and current level of functioning. Areas assessed included, but were not limited to: perception of social support, perception of ability to face and deal with challenges in life (positive functioning), anxiety symptoms, depressive symptoms, perspective on beliefs/belief system, coping skills for stress, intelligence level,  Therapeutic rapport was established. Interventions conducted today were geared towards incorporating medication management  along with support for continued therapy. Education was also provided as to the med management with this provider and what to expect in subsequent sessions.    We discussed risks, benefits,goals and side effects of the above medication and the patient was agreeable with the plan.Patient was educated on the importance of compliance with treatment and follow-up appointments. Patient is aware to contact the [facility] [phone] with any worsening of symptoms. To call for questions or concerns and return early if necessary. Patent is agreeable to go to the Emergency Department or call 911 should they begin SI/HI.     Part of this note may be an electronic transcription/translation of spoken language to printed text using the Dragon Dictation System.    Return in about 5 weeks (around 1/11/2023).    Sherrie Ramirez APRN

## 2023-01-11 ENCOUNTER — TELEMEDICINE (OUTPATIENT)
Dept: PSYCHIATRY | Facility: CLINIC | Age: 23
End: 2023-01-11
Payer: MEDICAID

## 2023-01-11 DIAGNOSIS — F43.10 PTSD (POST-TRAUMATIC STRESS DISORDER): ICD-10-CM

## 2023-01-11 DIAGNOSIS — F90.2 ATTENTION DEFICIT HYPERACTIVITY DISORDER (ADHD), COMBINED TYPE: ICD-10-CM

## 2023-01-11 DIAGNOSIS — F51.04 PSYCHOPHYSIOLOGICAL INSOMNIA: ICD-10-CM

## 2023-01-11 DIAGNOSIS — F31.9 BIPOLAR I DISORDER WITH DEPRESSION: Primary | ICD-10-CM

## 2023-01-11 PROCEDURE — 99214 OFFICE O/P EST MOD 30 MIN: CPT | Performed by: NURSE PRACTITIONER

## 2023-01-11 RX ORDER — DOXEPIN HYDROCHLORIDE 10 MG/1
10 CAPSULE ORAL NIGHTLY
Qty: 30 CAPSULE | Refills: 3 | Status: SHIPPED | OUTPATIENT
Start: 2023-01-11

## 2023-01-11 NOTE — PROGRESS NOTES
Patient Name: Toyin Workman  MRN: [id number]   :  2000     This provider is located at the [facility] (through [facility]), [address], [address] using a secure [employer] Video Visit through [employer]. Patient is being seen remotely via telehealth at their home address in [place], and stated they are in a secure environment for this session. The patient's condition being diagnosed/treated is appropriate for telemedicine. The provider identified herself as well as her credentials.   The patient, and/or patients guardian, consent to be seen remotely, and when consent is given they understand that the consent allows for patient identifiable information to be sent to a third party as needed.   They may refuse to be seen remotely at any time. The electronic data is encrypted and password protected, and the patient and/or guardian has been advised of the potential risks to privacy not withstanding such measures.    You have chosen to receive care through a telehealth visit.  Do you consent to use a video/audio connection for your medical care today? Yes    Chief Complaint:      ICD-10-CM ICD-9-CM   1. Bipolar I disorder with depression (HCC)  F31.9 296.50   2. PTSD (post-traumatic stress disorder)  F43.10 309.81   3. Attention deficit hyperactivity disorder (ADHD), combined type  F90.2 314.01   4. Psychophysiological insomnia  F51.04 307.42       History of Present Illness: Toyin Workman is a 22 y.o. male is here today for medication management follow up.  Patient states mood is good and sleep is better.  Does note some anxiety.    The following portions of the patient's history were reviewed and updated as appropriate: allergies, current medications, past family history, past medical history, past social history, past surgical history and problem list.    Review of Systems;;  Review of Systems   Constitutional: Negative for activity change, appetite change, fatigue,  unexpected weight gain and unexpected weight loss.   Respiratory: Negative for shortness of breath and wheezing.    Gastrointestinal: Negative for constipation, diarrhea, nausea and vomiting.   Musculoskeletal: Negative for gait problem.   Skin: Negative for dry skin and rash.   Neurological: Negative for dizziness, speech difficulty, weakness, light-headedness, headache, memory problem and confusion.   Psychiatric/Behavioral: Positive for decreased concentration and stress. Negative for agitation, behavioral problems, dysphoric mood, hallucinations, self-injury, sleep disturbance, suicidal ideas, negative for hyperactivity and depressed mood. The patient is nervous/anxious.        Physical Exam;;  Physical Exam  Constitutional:       General: He is not in acute distress.     Appearance: He is well-developed. He is not diaphoretic.   HENT:      Head: Normocephalic and atraumatic.   Eyes:      Conjunctiva/sclera: Conjunctivae normal.   Pulmonary:      Effort: Pulmonary effort is normal. No respiratory distress.   Musculoskeletal:         General: Normal range of motion.      Cervical back: Full passive range of motion without pain and normal range of motion.   Neurological:      Mental Status: He is alert and oriented to person, place, and time.   Psychiatric:         Mood and Affect: Mood is anxious. Mood is not depressed. Affect is not labile, blunt, angry or inappropriate.         Speech: Speech is not rapid and pressured or tangential.         Behavior: Behavior normal. Behavior is not agitated, slowed, aggressive, withdrawn, hyperactive or combative. Behavior is cooperative.         Thought Content: Thought content normal. Thought content is not paranoid or delusional. Thought content does not include homicidal or suicidal ideation. Thought content does not include homicidal or suicidal plan.         Judgment: Judgment normal.       There were no vitals taken for this visit.  There is no height or weight on file  to calculate BMI. Video appt unable to obtain.     Current Medications;;    Current Outpatient Medications:   •  buPROPion XL (WELLBUTRIN XL) 150 MG 24 hr tablet, Take 1 tablet by mouth Daily., Disp: 30 tablet, Rfl: 6  •  Cariprazine HCl (Vraylar) 3 MG capsule capsule, Take 1 capsule by mouth Daily., Disp: 30 capsule, Rfl: 6  •  doxepin (SINEquan) 10 MG capsule, Take 1 capsule by mouth Every Night., Disp: 30 capsule, Rfl: 3    Lab Results:   No visits with results within 3 Month(s) from this visit.   Latest known visit with results is:   No results found for any previous visit.       Mental Status Exam:   Hygiene:   good  Cooperation:  Cooperative  Eye Contact:  Good  Psychomotor Behavior:  Appropriate  Mood:anxious  Affect:  Appropriate  Hopelessness: Denies  Speech:  Normal  Thought Process:  Goal directed  Thought Content:  Normal  Suicidal:  None  Homicidal:  None  Hallucinations:  None  Delusion:  None  Memory:  Intact  Orientation:  Person, Place, Time and Situation  Reliability:  good  Insight:  Good  Judgement:  Good  Impulse Control:  Good    PHQ-9 Depression Screening  Little interest or pleasure in doing things?     Feeling down, depressed, or hopeless?     Trouble falling or staying asleep, or sleeping too much?     Feeling tired or having little energy?     Poor appetite or overeating?     Feeling bad about yourself - or that you are a failure or have let yourself or your family down?     Trouble concentrating on things, such as reading the newspaper or watching television?     Moving or speaking so slowly that other people could have noticed? Or the opposite - being so fidgety or restless that you have been moving around a lot more than usual?     Thoughts that you would be better off dead, or of hurting yourself in some way?     PHQ-9 Total Score     If you checked off any problems, how difficult have these problems made it for you to do your work, take care of things at home, or get along with other  people?          Assessment/Plan:  Diagnoses and all orders for this visit:    1. Bipolar I disorder with depression (HCC) (Primary)    2. PTSD (post-traumatic stress disorder)    3. Attention deficit hyperactivity disorder (ADHD), combined type    4. Psychophysiological insomnia  -     doxepin (SINEquan) 10 MG capsule; Take 1 capsule by mouth Every Night.  Dispense: 30 capsule; Refill: 3      Encouraged patient to reconsider taking ADHD medicine as this may help his anxiety that he is having.  Patient states he would think about it.  We will continue other medications and follow-up in 2 months.    A psychological evaluation was conducted in order to assess past and current level of functioning. Areas assessed included, but were not limited to: perception of social support, perception of ability to face and deal with challenges in life (positive functioning), anxiety symptoms, depressive symptoms, perspective on beliefs/belief system, coping skills for stress, intelligence level,  Therapeutic rapport was established. Interventions conducted today were geared towards incorporating medication management along with support for continued therapy. Education was also provided as to the med management with this provider and what to expect in subsequent sessions.    We discussed risks, benefits,goals and side effects of the above medication and the patient was agreeable with the plan.Patient was educated on the importance of compliance with treatment and follow-up appointments. Patient is aware to contact the [facility] [phone] with any worsening of symptoms. To call for questions or concerns and return early if necessary. Patent is agreeable to go to the Emergency Department or call 911 should they begin SI/HI.     Part of this note may be an electronic transcription/translation of spoken language to printed text using the Dragon Dictation System.    Return in about 2 months (around  3/11/2023) for Follow Up 30 min, Video visit.    Sherrie Ramirez, APRN

## 2023-01-27 ENCOUNTER — TELEPHONE (OUTPATIENT)
Dept: FAMILY MEDICINE CLINIC | Facility: CLINIC | Age: 23
End: 2023-01-27
Payer: MEDICAID

## 2023-03-28 ENCOUNTER — LAB (OUTPATIENT)
Dept: LAB | Facility: HOSPITAL | Age: 23
End: 2023-03-28
Payer: MEDICAID

## 2023-03-28 ENCOUNTER — OFFICE VISIT (OUTPATIENT)
Dept: FAMILY MEDICINE CLINIC | Facility: CLINIC | Age: 23
End: 2023-03-28
Payer: MEDICAID

## 2023-03-28 VITALS
SYSTOLIC BLOOD PRESSURE: 150 MMHG | HEIGHT: 70 IN | HEART RATE: 97 BPM | WEIGHT: 297.6 LBS | TEMPERATURE: 98.6 F | BODY MASS INDEX: 42.61 KG/M2 | OXYGEN SATURATION: 100 % | DIASTOLIC BLOOD PRESSURE: 100 MMHG

## 2023-03-28 DIAGNOSIS — E66.01 CLASS 3 SEVERE OBESITY DUE TO EXCESS CALORIES WITHOUT SERIOUS COMORBIDITY WITH BODY MASS INDEX (BMI) OF 40.0 TO 44.9 IN ADULT: ICD-10-CM

## 2023-03-28 DIAGNOSIS — R55 SYNCOPE AND COLLAPSE: Primary | ICD-10-CM

## 2023-03-28 DIAGNOSIS — R03.0 ELEVATED BLOOD PRESSURE READING IN OFFICE WITHOUT DIAGNOSIS OF HYPERTENSION: ICD-10-CM

## 2023-03-28 DIAGNOSIS — R55 SYNCOPE AND COLLAPSE: ICD-10-CM

## 2023-03-28 PROBLEM — E66.813 CLASS 3 SEVERE OBESITY DUE TO EXCESS CALORIES WITHOUT SERIOUS COMORBIDITY WITH BODY MASS INDEX (BMI) OF 40.0 TO 44.9 IN ADULT: Status: ACTIVE | Noted: 2023-03-28

## 2023-03-28 LAB
BASOPHILS # BLD AUTO: 0.09 10*3/MM3 (ref 0–0.2)
BASOPHILS NFR BLD AUTO: 0.6 % (ref 0–1.5)
DEPRECATED RDW RBC AUTO: 40.9 FL (ref 37–54)
EOSINOPHIL # BLD AUTO: 0.75 10*3/MM3 (ref 0–0.4)
EOSINOPHIL NFR BLD AUTO: 5.4 % (ref 0.3–6.2)
ERYTHROCYTE [DISTWIDTH] IN BLOOD BY AUTOMATED COUNT: 13.3 % (ref 12.3–15.4)
HCT VFR BLD AUTO: 44.6 % (ref 37.5–51)
HGB BLD-MCNC: 15.1 G/DL (ref 13–17.7)
IMM GRANULOCYTES # BLD AUTO: 0.05 10*3/MM3 (ref 0–0.05)
IMM GRANULOCYTES NFR BLD AUTO: 0.4 % (ref 0–0.5)
LYMPHOCYTES # BLD AUTO: 4.34 10*3/MM3 (ref 0.7–3.1)
LYMPHOCYTES NFR BLD AUTO: 31.2 % (ref 19.6–45.3)
MCH RBC QN AUTO: 28.9 PG (ref 26.6–33)
MCHC RBC AUTO-ENTMCNC: 33.9 G/DL (ref 31.5–35.7)
MCV RBC AUTO: 85.4 FL (ref 79–97)
MONOCYTES # BLD AUTO: 1.26 10*3/MM3 (ref 0.1–0.9)
MONOCYTES NFR BLD AUTO: 9 % (ref 5–12)
NEUTROPHILS NFR BLD AUTO: 53.4 % (ref 42.7–76)
NEUTROPHILS NFR BLD AUTO: 7.44 10*3/MM3 (ref 1.7–7)
NRBC BLD AUTO-RTO: 0 /100 WBC (ref 0–0.2)
PLATELET # BLD AUTO: 386 10*3/MM3 (ref 140–450)
PMV BLD AUTO: 9.8 FL (ref 6–12)
RBC # BLD AUTO: 5.22 10*6/MM3 (ref 4.14–5.8)
WBC NRBC COR # BLD: 13.93 10*3/MM3 (ref 3.4–10.8)

## 2023-03-28 PROCEDURE — 80053 COMPREHEN METABOLIC PANEL: CPT

## 2023-03-28 PROCEDURE — 85025 COMPLETE CBC W/AUTO DIFF WBC: CPT

## 2023-03-28 PROCEDURE — 80061 LIPID PANEL: CPT

## 2023-03-28 PROCEDURE — 83036 HEMOGLOBIN GLYCOSYLATED A1C: CPT

## 2023-03-28 PROCEDURE — 84443 ASSAY THYROID STIM HORMONE: CPT

## 2023-03-28 NOTE — ASSESSMENT & PLAN NOTE
Differential diagnosis for syncope and collapse remains broad at this time.  Patient has had no medication changes.  EKG and neurologic exam are unremarkable.  Order labs for further evaluation.  I suspect the patient's symptoms may have been secondary to dehydration and hypoglycemia.  Patient was encouraged to drink 8 to 12 cups of water daily and eat regularly through the day.  Elevated blood pressure may also be playing a part in his symptoms.  Blood pressure was elevated here in clinic today and increased each time it was rechecked.  Patient has not taken medications for blood pressure in the past.  He will begin to monitor his blood pressure at home daily and if symptoms return.  Patient will follow-up in 2 weeks for further evaluation of elevated blood pressure and syncope.  RTC/ED precautions given.

## 2023-03-28 NOTE — PROGRESS NOTES
Toyin Workman is a 22 y.o. male who presents today for Loss of Consciousness (This happened Thursday @ 1:45 PM while at work.)      Patient was at work Thursday afternoon and began having a headache and feeling drowsy. He felt very clammy and was sweating. He could feel his heart beating rapidly. He felt like he had no control over his body. He states that his vision went black and he could hear but not see or move. He fell after he LOC and hit his head. He was told he was shaky when he was on the ground. He was out for 1-2 minutes. He was not taken to the ED. He co worker stated he was pale and sweaty. They took his vitals and his blood pressure was 196/104 with heart rate of 114 and blood sugar or 68. They give him some orange juice and his color came back and he started to feel back to normal. He went home and rested. He does state that he felt fatigued and foggy headed for 5-10 minutes after the episode. He has not had anything like this before and it hasn't happened since. He does feel a little off still and has been having headaches. He has worsening headache with bright light. He still feels fatigued. The day the episode happened he did not have any change in activity level. He states he does not drink much water and had not eaten since the night before the episode.  He does not drink soda or energy drinks. He typical eats only eats dinner and a snack. He has not had further dizziness unless he has sunlight in his eyes. He did not eat anything yesterday and felt worsening dizziness but this resolved after he ate something. He has not had any recent medication changes. He has never had a seizure in the past but his older sister does. He has a family history of heart disease in his mother who had a heart attack.        Review of Systems   Constitutional: Positive for diaphoresis and fatigue. Negative for fever and unexpected weight loss.   HENT: Negative for congestion, ear pain and sore throat.    Eyes: Negative  "for visual disturbance.   Respiratory: Negative for cough, shortness of breath and wheezing.    Cardiovascular: Negative for chest pain and palpitations.   Gastrointestinal: Positive for nausea (mild occasional). Negative for abdominal pain, anal bleeding, blood in stool, constipation, diarrhea, rectal pain, vomiting and GERD.        Negative for melena   Endocrine: Negative for polydipsia and polyuria.   Genitourinary: Negative for difficulty urinating.   Musculoskeletal: Negative for joint swelling.   Skin: Negative for rash and skin lesions.   Allergic/Immunologic: Negative for environmental allergies.   Neurological: Positive for dizziness, syncope and headache. Negative for seizures.   Hematological: Does not bruise/bleed easily.   Psychiatric/Behavioral: Negative for suicidal ideas.        The following portions of the patient's history were reviewed and updated as appropriate: allergies, current medications, past family history, past medical history, past social history, past surgical history and problem list.    Current Outpatient Medications on File Prior to Visit   Medication Sig Dispense Refill   • buPROPion XL (WELLBUTRIN XL) 150 MG 24 hr tablet Take 1 tablet by mouth Daily. 30 tablet 6   • Cariprazine HCl (Vraylar) 3 MG capsule capsule Take 1 capsule by mouth Daily. 30 capsule 6   • doxepin (SINEquan) 10 MG capsule Take 1 capsule by mouth Every Night. 30 capsule 3     No current facility-administered medications on file prior to visit.       Allergies   Allergen Reactions   • Liquimat Lotion [Elemental Sulfur] Itching        Visit Vitals  /100 (BP Location: Left arm, Patient Position: Sitting, Cuff Size: Large Adult)   Pulse 97   Temp 98.6 °F (37 °C)   Ht 179 cm (70.47\")   Wt 135 kg (297 lb 9.6 oz)   SpO2 100%   BMI 42.13 kg/m²          Physical Exam  Constitutional:       General: He is not in acute distress.     Appearance: He is well-developed. He is not diaphoretic.   HENT:      Head: " Atraumatic.   Eyes:      General: Lids are normal. Vision grossly intact. Gaze aligned appropriately.      Extraocular Movements: Extraocular movements intact.      Right eye: Normal extraocular motion and no nystagmus.      Left eye: Normal extraocular motion and no nystagmus.      Conjunctiva/sclera: Conjunctivae normal.   Cardiovascular:      Rate and Rhythm: Normal rate and regular rhythm.      Heart sounds: Normal heart sounds. No murmur heard.    No friction rub. No gallop.   Pulmonary:      Effort: Pulmonary effort is normal. No respiratory distress.      Breath sounds: Normal breath sounds. No stridor. No wheezing, rhonchi or rales.   Abdominal:      General: Bowel sounds are normal. There is no distension.      Palpations: Abdomen is soft. There is no mass.      Tenderness: There is no abdominal tenderness. There is no guarding or rebound.      Hernia: No hernia is present.   Musculoskeletal:      Cervical back: Normal range of motion and neck supple.   Skin:     General: Skin is warm and dry.   Neurological:      Mental Status: He is alert and oriented to person, place, and time.      Cranial Nerves: Cranial nerves 2-12 are intact.      Sensory: Sensation is intact.      Motor: Motor function is intact.      Coordination: Coordination is intact.      Deep Tendon Reflexes: Reflexes are normal and symmetric.   Psychiatric:         Behavior: Behavior normal.            ECG 12 Lead    Date/Time: 3/28/2023 2:08 PM  Performed by: Ruben Sharma MD  Authorized by: Ruben Sharma MD   Comparison: not compared with previous ECG   Rhythm: sinus rhythm  Rate: normal  Conduction: conduction normal  ST Segments: ST segments normal  T Waves: T waves normal  QRS axis: normal  Other: no other findings    Clinical impression: normal ECG             Problems Addressed this Visit        Cardiac and Vasculature    Elevated blood pressure reading in office without diagnosis of hypertension       Endocrine and  Metabolic    Class 3 severe obesity due to excess calories without serious comorbidity with body mass index (BMI) of 40.0 to 44.9 in adult ([facility])    Relevant Orders    Comprehensive Metabolic Panel    TSH Rfx On Abnormal To Free T4    CBC & Differential    Lipid Panel    Hemoglobin A1c       Symptoms and Signs    Syncope and collapse - Primary     Differential diagnosis for syncope and collapse remains broad at this time.  Patient has had no medication changes.  EKG and neurologic exam are unremarkable.  Order labs for further evaluation.  I suspect the patient's symptoms may have been secondary to dehydration and hypoglycemia.  Patient was encouraged to drink 8 to 12 cups of water daily and eat regularly through the day.  Elevated blood pressure may also be playing a part in his symptoms.  Blood pressure was elevated here in clinic today and increased each time it was rechecked.  Patient has not taken medications for blood pressure in the past.  He will begin to monitor his blood pressure at home daily and if symptoms return.  Patient will follow-up in 2 weeks for further evaluation of elevated blood pressure and syncope.  RTC/ED precautions given.         Relevant Orders    Comprehensive Metabolic Panel    TSH Rfx On Abnormal To Free T4    CBC & Differential    Lipid Panel    Hemoglobin A1c    ECG 12 Lead   Diagnoses       Codes Comments    Syncope and collapse    -  Primary ICD-10-CM: R55  ICD-9-CM: 780.2     Elevated blood pressure reading in office without diagnosis of hypertension     ICD-10-CM: R03.0  ICD-9-CM: 796.2     Class 3 severe obesity due to excess calories without serious comorbidity with body mass index (BMI) of 40.0 to 44.9 in adult ([facility])     ICD-10-CM: E66.01, Z68.41  ICD-9-CM: 278.01, V85.41           Return in about 2 weeks (around 4/11/2023) for Follow-up syncope, HTN, HA.    Ruben Sharma MD   3/28/2023

## 2023-03-29 LAB
ALBUMIN SERPL-MCNC: 4.9 G/DL (ref 3.5–5.2)
ALBUMIN/GLOB SERPL: 1.6 G/DL
ALP SERPL-CCNC: 79 U/L (ref 39–117)
ALT SERPL W P-5'-P-CCNC: 24 U/L (ref 1–41)
ANION GAP SERPL CALCULATED.3IONS-SCNC: 12 MMOL/L (ref 5–15)
AST SERPL-CCNC: 21 U/L (ref 1–40)
BILIRUB SERPL-MCNC: 0.5 MG/DL (ref 0–1.2)
BUN SERPL-MCNC: 9 MG/DL (ref 6–20)
BUN/CREAT SERPL: 7.7 (ref 7–25)
CALCIUM SPEC-SCNC: 9.9 MG/DL (ref 8.6–10.5)
CHLORIDE SERPL-SCNC: 106 MMOL/L (ref 98–107)
CHOLEST SERPL-MCNC: 183 MG/DL (ref 0–200)
CO2 SERPL-SCNC: 25 MMOL/L (ref 22–29)
CREAT SERPL-MCNC: 1.17 MG/DL (ref 0.76–1.27)
EGFRCR SERPLBLD CKD-EPI 2021: 91.5 ML/MIN/1.73
GLOBULIN UR ELPH-MCNC: 3 GM/DL
GLUCOSE SERPL-MCNC: 83 MG/DL (ref 65–99)
HBA1C MFR BLD: 5.4 % (ref 4.8–5.6)
HDLC SERPL-MCNC: 35 MG/DL (ref 40–60)
LDLC SERPL CALC-MCNC: 117 MG/DL (ref 0–100)
LDLC/HDLC SERPL: 3.22 {RATIO}
POTASSIUM SERPL-SCNC: 4 MMOL/L (ref 3.5–5.2)
PROT SERPL-MCNC: 7.9 G/DL (ref 6–8.5)
SODIUM SERPL-SCNC: 143 MMOL/L (ref 136–145)
TRIGL SERPL-MCNC: 176 MG/DL (ref 0–150)
TSH SERPL DL<=0.05 MIU/L-ACNC: 3.06 UIU/ML (ref 0.27–4.2)
VLDLC SERPL-MCNC: 31 MG/DL (ref 5–40)

## 2023-03-30 ENCOUNTER — TELEPHONE (OUTPATIENT)
Dept: FAMILY MEDICINE CLINIC | Facility: CLINIC | Age: 23
End: 2023-03-30
Payer: MEDICAID

## 2023-03-30 NOTE — TELEPHONE ENCOUNTER
"Hub please read: \"Please let the patient know that labs that were drawn at previous visit were stable except for mild elevation in cholesterol and white blood cell count.  We will continue to monitor these going forward. Patient should continue current treatment plan.  If patient has any questions they can contact me.   \"  "

## 2023-04-14 ENCOUNTER — OFFICE VISIT (OUTPATIENT)
Dept: FAMILY MEDICINE CLINIC | Facility: CLINIC | Age: 23
End: 2023-04-14
Payer: MEDICAID

## 2023-04-14 VITALS
HEIGHT: 71 IN | SYSTOLIC BLOOD PRESSURE: 130 MMHG | HEART RATE: 77 BPM | RESPIRATION RATE: 21 BRPM | WEIGHT: 297.4 LBS | OXYGEN SATURATION: 99 % | BODY MASS INDEX: 41.64 KG/M2 | DIASTOLIC BLOOD PRESSURE: 92 MMHG | TEMPERATURE: 98.2 F

## 2023-04-14 DIAGNOSIS — I10 PRIMARY HYPERTENSION: Primary | ICD-10-CM

## 2023-04-14 DIAGNOSIS — F41.0 ANXIETY ATTACK: ICD-10-CM

## 2023-04-14 PROBLEM — R55 SYNCOPE AND COLLAPSE: Status: RESOLVED | Noted: 2023-03-28 | Resolved: 2023-04-14

## 2023-04-14 PROCEDURE — 3080F DIAST BP >= 90 MM HG: CPT | Performed by: FAMILY MEDICINE

## 2023-04-14 PROCEDURE — 99214 OFFICE O/P EST MOD 30 MIN: CPT | Performed by: FAMILY MEDICINE

## 2023-04-14 PROCEDURE — 1160F RVW MEDS BY RX/DR IN RCRD: CPT | Performed by: FAMILY MEDICINE

## 2023-04-14 PROCEDURE — 3075F SYST BP GE 130 - 139MM HG: CPT | Performed by: FAMILY MEDICINE

## 2023-04-14 PROCEDURE — 1159F MED LIST DOCD IN RCRD: CPT | Performed by: FAMILY MEDICINE

## 2023-04-14 RX ORDER — PROPRANOLOL HYDROCHLORIDE 10 MG/1
10 TABLET ORAL 2 TIMES DAILY
Qty: 60 TABLET | Refills: 2 | Status: SHIPPED | OUTPATIENT
Start: 2023-04-14

## 2023-04-14 NOTE — PROGRESS NOTES
Toyin Workman is a 22 y.o. male who presents today for Hypertension (B/P has been up and down. Last couple days have been good.)      Patient has been checking his blood pressure at home and was seeing elevated numbers after his last office visit with numbers in the 150-160/100 range. He has seen the number trend down and is now seeing numbers in the 120-130s/80-90s. He feels like the blood pressure elevations he was having could have been anxiety related. He has been feeling more anxious lately. He has worsening anxiety when he is in a room with more than 4 people. He has an appointment with behavioral health on 4/20/23 and will discuss further with them. He does have some dizziness when he gets anxious but has not had further episodes where he felt like he was going to pass out and has not had another episodes of LOC. He has been eating regulalry and drinking more water and feels like this had helped.        Review of Systems   Constitutional: Negative for fever and unexpected weight loss.   HENT: Negative for congestion, ear pain and sore throat.    Eyes: Negative for visual disturbance.   Respiratory: Negative for cough, shortness of breath and wheezing.    Cardiovascular: Negative for chest pain and palpitations.   Gastrointestinal: Negative for abdominal pain, blood in stool, constipation, diarrhea, nausea, vomiting and GERD.   Endocrine: Negative for polydipsia and polyuria.   Genitourinary: Negative for difficulty urinating.   Musculoskeletal: Negative for joint swelling.   Skin: Negative for rash and skin lesions.   Allergic/Immunologic: Negative for environmental allergies.   Neurological: Positive for dizziness. Negative for seizures and syncope.   Hematological: Does not bruise/bleed easily.   Psychiatric/Behavioral: Negative for dysphoric mood, suicidal ideas and depressed mood. The patient is nervous/anxious.         The following portions of the patient's history were reviewed and updated as  "appropriate: allergies, current medications, past family history, past medical history, past social history, past surgical history and problem list.    Current Outpatient Medications on File Prior to Visit   Medication Sig Dispense Refill   • buPROPion XL (WELLBUTRIN XL) 150 MG 24 hr tablet Take 1 tablet by mouth Daily. 30 tablet 6   • Cariprazine HCl (Vraylar) 3 MG capsule capsule Take 1 capsule by mouth Daily. 30 capsule 6   • doxepin (SINEquan) 10 MG capsule Take 1 capsule by mouth Every Night. 30 capsule 3     No current facility-administered medications on file prior to visit.       Allergies   Allergen Reactions   • Liquimat Lotion [Elemental Sulfur] Itching        Visit Vitals  /92   Pulse 77   Temp 98.2 °F (36.8 °C)   Resp 21   Ht 180 cm (70.87\")   Wt 135 kg (297 lb 6.4 oz)   SpO2 99%   BMI 41.64 kg/m²        Physical Exam  Constitutional:       General: He is not in acute distress.     Appearance: He is well-developed. He is not diaphoretic.   HENT:      Head: Atraumatic.   Cardiovascular:      Rate and Rhythm: Normal rate and regular rhythm.      Heart sounds: Normal heart sounds. No murmur heard.    No friction rub. No gallop.   Pulmonary:      Effort: Pulmonary effort is normal. No respiratory distress.      Breath sounds: Normal breath sounds. No stridor. No wheezing, rhonchi or rales.   Musculoskeletal:      Cervical back: Normal range of motion and neck supple.   Skin:     General: Skin is warm and dry.   Neurological:      Mental Status: He is alert and oriented to person, place, and time.   Psychiatric:         Behavior: Behavior normal.          Results for orders placed or performed in visit on 03/28/23   Comprehensive Metabolic Panel    Specimen: Blood   Result Value Ref Range    Glucose 83 65 - 99 mg/dL    BUN 9 6 - 20 mg/dL    Creatinine 1.17 0.76 - 1.27 mg/dL    Sodium 143 136 - 145 mmol/L    Potassium 4.0 3.5 - 5.2 mmol/L    Chloride 106 98 - 107 mmol/L    CO2 25.0 22.0 - 29.0 mmol/L    " Calcium 9.9 8.6 - 10.5 mg/dL    Total Protein 7.9 6.0 - 8.5 g/dL    Albumin 4.9 3.5 - 5.2 g/dL    ALT (SGPT) 24 1 - 41 U/L    AST (SGOT) 21 1 - 40 U/L    Alkaline Phosphatase 79 39 - 117 U/L    Total Bilirubin 0.5 0.0 - 1.2 mg/dL    Globulin 3.0 gm/dL    A/G Ratio 1.6 g/dL    BUN/Creatinine Ratio 7.7 7.0 - 25.0    Anion Gap 12.0 5.0 - 15.0 mmol/L    eGFR 91.5 >60.0 mL/min/1.73   TSH Rfx On Abnormal To Free T4    Specimen: Blood   Result Value Ref Range    TSH 3.060 0.270 - 4.200 uIU/mL   Lipid Panel    Specimen: Blood   Result Value Ref Range    Total Cholesterol 183 0 - 200 mg/dL    Triglycerides 176 (H) 0 - 150 mg/dL    HDL Cholesterol 35 (L) 40 - 60 mg/dL    LDL Cholesterol  117 (H) 0 - 100 mg/dL    VLDL Cholesterol 31 5 - 40 mg/dL    LDL/HDL Ratio 3.22    Hemoglobin A1c    Specimen: Blood   Result Value Ref Range    Hemoglobin A1C 5.40 4.80 - 5.60 %   CBC Auto Differential    Specimen: Blood   Result Value Ref Range    WBC 13.93 (H) 3.40 - 10.80 10*3/mm3    RBC 5.22 4.14 - 5.80 10*6/mm3    Hemoglobin 15.1 13.0 - 17.7 g/dL    Hematocrit 44.6 37.5 - 51.0 %    MCV 85.4 79.0 - 97.0 fL    MCH 28.9 26.6 - 33.0 pg    MCHC 33.9 31.5 - 35.7 g/dL    RDW 13.3 12.3 - 15.4 %    RDW-SD 40.9 37.0 - 54.0 fl    MPV 9.8 6.0 - 12.0 fL    Platelets 386 140 - 450 10*3/mm3    Neutrophil % 53.4 42.7 - 76.0 %    Lymphocyte % 31.2 19.6 - 45.3 %    Monocyte % 9.0 5.0 - 12.0 %    Eosinophil % 5.4 0.3 - 6.2 %    Basophil % 0.6 0.0 - 1.5 %    Immature Grans % 0.4 0.0 - 0.5 %    Neutrophils, Absolute 7.44 (H) 1.70 - 7.00 10*3/mm3    Lymphocytes, Absolute 4.34 (H) 0.70 - 3.10 10*3/mm3    Monocytes, Absolute 1.26 (H) 0.10 - 0.90 10*3/mm3    Eosinophils, Absolute 0.75 (H) 0.00 - 0.40 10*3/mm3    Basophils, Absolute 0.09 0.00 - 0.20 10*3/mm3    Immature Grans, Absolute 0.05 0.00 - 0.05 10*3/mm3    nRBC 0.0 0.0 - 0.2 /100 WBC        Problems Addressed this Visit        Cardiac and Vasculature    Primary hypertension - Primary     Hypertension is  newly identified.  Dietary sodium restriction.  Weight loss.  Regular aerobic exercise.  Stop smoking.  Medication changes per orders.  Patient was started on propranolol 10 mg twice daily.  This may also provide some relief of anxiety symptoms.  Ambulatory blood pressure monitoring.  Blood pressure will be reassessed in 3 months.         Relevant Medications    propranolol (INDERAL) 10 MG tablet       Mental Health    Anxiety attack     Patient feels like his anxiety is not as well-controlled.  He has a follow-up appointment with his behavioral health specialist in about a week.  He will discuss further medication adjustment with her at that time.  Patient was started on propranolol which may help with anxiety as well as blood pressure.  RTC/ED precautions given.         Relevant Medications    propranolol (INDERAL) 10 MG tablet   Diagnoses       Codes Comments    Primary hypertension    -  Primary ICD-10-CM: I10  ICD-9-CM: 401.9     Anxiety attack     ICD-10-CM: F41.0  ICD-9-CM: 300.01           Return in about 3 months (around 7/14/2023) for Follow-up HTN, anxiety.    Ruben Sharma MD   4/14/2023

## 2023-04-14 NOTE — ASSESSMENT & PLAN NOTE
Patient feels like his anxiety is not as well-controlled.  He has a follow-up appointment with his behavioral health specialist in about a week.  He will discuss further medication adjustment with her at that time.  Patient was started on propranolol which may help with anxiety as well as blood pressure.  RTC/ED precautions given.

## 2023-04-14 NOTE — ASSESSMENT & PLAN NOTE
Hypertension is newly identified.  Dietary sodium restriction.  Weight loss.  Regular aerobic exercise.  Stop smoking.  Medication changes per orders.  Patient was started on propranolol 10 mg twice daily.  This may also provide some relief of anxiety symptoms.  Ambulatory blood pressure monitoring.  Blood pressure will be reassessed in 3 months.

## 2023-04-29 ENCOUNTER — HOSPITAL ENCOUNTER (EMERGENCY)
Facility: HOSPITAL | Age: 23
Discharge: HOME | End: 2023-04-29
Attending: EMERGENCY MEDICINE
Payer: MEDICAID

## 2023-04-29 ENCOUNTER — APPOINTMENT (OUTPATIENT)
Dept: CT IMAGING | Facility: HOSPITAL | Age: 23
End: 2023-04-29
Payer: MEDICAID

## 2023-04-29 VITALS
RESPIRATION RATE: 16 BRPM | DIASTOLIC BLOOD PRESSURE: 85 MMHG | TEMPERATURE: 98.4 F | HEART RATE: 109 BPM | HEIGHT: 72 IN | OXYGEN SATURATION: 94 % | SYSTOLIC BLOOD PRESSURE: 145 MMHG | BODY MASS INDEX: 39.28 KG/M2 | WEIGHT: 290 LBS

## 2023-04-29 DIAGNOSIS — R56.9 CONVULSIONS, UNSPECIFIED CONVULSION TYPE: ICD-10-CM

## 2023-04-29 DIAGNOSIS — R40.4 TRANSIENT ALTERATION OF AWARENESS: ICD-10-CM

## 2023-04-29 DIAGNOSIS — R41.82 ALTERED MENTAL STATUS, UNSPECIFIED ALTERED MENTAL STATUS TYPE: Primary | ICD-10-CM

## 2023-04-29 LAB
ALBUMIN SERPL-MCNC: 4.3 G/DL (ref 3.5–5.2)
ALBUMIN/GLOB SERPL: 1.6 G/DL
ALP SERPL-CCNC: 63 U/L (ref 39–117)
ALT SERPL W P-5'-P-CCNC: 19 U/L (ref 1–41)
AMPHET+METHAMPHET UR QL: NEGATIVE
AMPHETAMINES UR QL: NEGATIVE
ANION GAP SERPL CALCULATED.3IONS-SCNC: 13 MMOL/L (ref 5–15)
AST SERPL-CCNC: 19 U/L (ref 1–40)
BACTERIA UR QL AUTO: NORMAL /HPF
BARBITURATES UR QL SCN: NEGATIVE
BASOPHILS # BLD AUTO: 0.05 10*3/MM3 (ref 0–0.2)
BASOPHILS NFR BLD AUTO: 0.5 % (ref 0–1.5)
BENZODIAZ UR QL SCN: NEGATIVE
BILIRUB SERPL-MCNC: 0.4 MG/DL (ref 0–1.2)
BILIRUB UR QL STRIP: NEGATIVE
BUN SERPL-MCNC: 12 MG/DL (ref 6–20)
BUN/CREAT SERPL: 8.9 (ref 7–25)
BUPRENORPHINE SERPL-MCNC: NEGATIVE NG/ML
CALCIUM SPEC-SCNC: 8.9 MG/DL (ref 8.6–10.5)
CANNABINOIDS SERPL QL: POSITIVE
CHLORIDE SERPL-SCNC: 106 MMOL/L (ref 98–107)
CLARITY UR: CLEAR
CO2 SERPL-SCNC: 22 MMOL/L (ref 22–29)
COCAINE UR QL: NEGATIVE
COLOR UR: YELLOW
CREAT SERPL-MCNC: 1.35 MG/DL (ref 0.76–1.27)
DEPRECATED RDW RBC AUTO: 43.3 FL (ref 37–54)
EGFRCR SERPLBLD CKD-EPI 2021: 77.1 ML/MIN/1.73
EOSINOPHIL # BLD AUTO: 0.61 10*3/MM3 (ref 0–0.4)
EOSINOPHIL NFR BLD AUTO: 5.8 % (ref 0.3–6.2)
ERYTHROCYTE [DISTWIDTH] IN BLOOD BY AUTOMATED COUNT: 13.6 % (ref 12.3–15.4)
ETHANOL BLD-MCNC: 22 MG/DL (ref 0–10)
GLOBULIN UR ELPH-MCNC: 2.7 GM/DL
GLUCOSE BLDC GLUCOMTR-MCNC: 95 MG/DL (ref 70–130)
GLUCOSE SERPL-MCNC: 95 MG/DL (ref 65–99)
GLUCOSE UR STRIP-MCNC: NEGATIVE MG/DL
HCT VFR BLD AUTO: 40.6 % (ref 37.5–51)
HGB BLD-MCNC: 13.4 G/DL (ref 13–17.7)
HGB UR QL STRIP.AUTO: NEGATIVE
HOLD SPECIMEN: NORMAL
HOLD SPECIMEN: NORMAL
HYALINE CASTS UR QL AUTO: NORMAL /LPF
IMM GRANULOCYTES # BLD AUTO: 0.05 10*3/MM3 (ref 0–0.05)
IMM GRANULOCYTES NFR BLD AUTO: 0.5 % (ref 0–0.5)
KETONES UR QL STRIP: ABNORMAL
LEUKOCYTE ESTERASE UR QL STRIP.AUTO: NEGATIVE
LYMPHOCYTES # BLD AUTO: 3.11 10*3/MM3 (ref 0.7–3.1)
LYMPHOCYTES NFR BLD AUTO: 29.7 % (ref 19.6–45.3)
MCH RBC QN AUTO: 28.9 PG (ref 26.6–33)
MCHC RBC AUTO-ENTMCNC: 33 G/DL (ref 31.5–35.7)
MCV RBC AUTO: 87.5 FL (ref 79–97)
METHADONE UR QL SCN: NEGATIVE
MONOCYTES # BLD AUTO: 0.72 10*3/MM3 (ref 0.1–0.9)
MONOCYTES NFR BLD AUTO: 6.9 % (ref 5–12)
NEUTROPHILS NFR BLD AUTO: 5.94 10*3/MM3 (ref 1.7–7)
NEUTROPHILS NFR BLD AUTO: 56.6 % (ref 42.7–76)
NITRITE UR QL STRIP: NEGATIVE
NRBC BLD AUTO-RTO: 0 /100 WBC (ref 0–0.2)
OPIATES UR QL: NEGATIVE
OXYCODONE UR QL SCN: NEGATIVE
PCP UR QL SCN: NEGATIVE
PH UR STRIP.AUTO: 6 [PH] (ref 5–8)
PLATELET # BLD AUTO: 318 10*3/MM3 (ref 140–450)
PMV BLD AUTO: 9.4 FL (ref 6–12)
POTASSIUM SERPL-SCNC: 3.7 MMOL/L (ref 3.5–5.2)
PROPOXYPH UR QL: NEGATIVE
PROT SERPL-MCNC: 7 G/DL (ref 6–8.5)
PROT UR QL STRIP: ABNORMAL
RBC # BLD AUTO: 4.64 10*6/MM3 (ref 4.14–5.8)
RBC # UR STRIP: NORMAL /HPF
REF LAB TEST METHOD: NORMAL
SODIUM SERPL-SCNC: 141 MMOL/L (ref 136–145)
SP GR UR STRIP: 1.02 (ref 1–1.03)
SQUAMOUS #/AREA URNS HPF: NORMAL /HPF
TRICYCLICS UR QL SCN: NEGATIVE
UROBILINOGEN UR QL STRIP: ABNORMAL
WBC # UR STRIP: NORMAL /HPF
WBC NRBC COR # BLD: 10.48 10*3/MM3 (ref 3.4–10.8)
WHOLE BLOOD HOLD COAG: NORMAL
WHOLE BLOOD HOLD SPECIMEN: NORMAL

## 2023-04-29 PROCEDURE — 80053 COMPREHEN METABOLIC PANEL: CPT | Performed by: EMERGENCY MEDICINE

## 2023-04-29 PROCEDURE — 70450 CT HEAD/BRAIN W/O DYE: CPT

## 2023-04-29 PROCEDURE — 80306 DRUG TEST PRSMV INSTRMNT: CPT | Performed by: EMERGENCY MEDICINE

## 2023-04-29 PROCEDURE — 85025 COMPLETE CBC W/AUTO DIFF WBC: CPT | Performed by: EMERGENCY MEDICINE

## 2023-04-29 PROCEDURE — 36415 COLL VENOUS BLD VENIPUNCTURE: CPT

## 2023-04-29 PROCEDURE — 81001 URINALYSIS AUTO W/SCOPE: CPT | Performed by: EMERGENCY MEDICINE

## 2023-04-29 PROCEDURE — 82077 ASSAY SPEC XCP UR&BREATH IA: CPT | Performed by: EMERGENCY MEDICINE

## 2023-04-29 PROCEDURE — 82948 REAGENT STRIP/BLOOD GLUCOSE: CPT

## 2023-04-29 PROCEDURE — 99284 EMERGENCY DEPT VISIT MOD MDM: CPT

## 2023-04-29 PROCEDURE — 93005 ELECTROCARDIOGRAM TRACING: CPT | Performed by: EMERGENCY MEDICINE

## 2023-04-29 RX ORDER — LEVETIRACETAM 500 MG/1
500 TABLET ORAL ONCE
Status: COMPLETED | OUTPATIENT
Start: 2023-04-29 | End: 2023-04-29

## 2023-04-29 RX ORDER — SODIUM CHLORIDE 0.9 % (FLUSH) 0.9 %
10 SYRINGE (ML) INJECTION AS NEEDED
Status: DISCONTINUED | OUTPATIENT
Start: 2023-04-29 | End: 2023-04-30 | Stop reason: HOSPADM

## 2023-04-29 RX ORDER — LEVETIRACETAM 500 MG/1
500 TABLET ORAL 2 TIMES DAILY
Qty: 60 TABLET | Refills: 0 | Status: SHIPPED | OUTPATIENT
Start: 2023-04-29 | End: 2023-05-29

## 2023-04-29 RX ADMIN — LEVETIRACETAM 500 MG: 500 TABLET, FILM COATED ORAL at 23:49

## 2023-04-29 NOTE — Clinical Note
[facility] EMERGENCY DEPARTMENT  [address] [address]  Phone: [phone]    Toyin Workman was seen and treated in our emergency department on 4/29/2023.  He may return to work on 05/02/2023.         Thank you for choosing [facility].    Gita Wright MD

## 2023-04-29 NOTE — Clinical Note
[facility] EMERGENCY DEPARTMENT  [address] [address]  Phone: [phone]    Toyin Workman was seen and treated in our emergency department on 4/29/2023.  He may return to work on 05/02/2023.         Thank you for choosing [facility].    Gita Wright MD

## 2023-04-30 LAB — HOLD SPECIMEN: NORMAL

## 2023-04-30 NOTE — ED PROVIDER NOTES
Subjective   History of Present Illness  22-year-old brought in for evaluation of multiple episodes of altered mental status.  The patient over an hour and a half.  The patient reportedly had drank a couple shots of alcohol earlier the day and also had smoked some marijuana.  Roughly an hour and 1/2 to 2 hours ago the patient was outside cooking on the grill some tongues when he began to feel poorly and he handed the tongues off to somebody else.  He walked inside and then became gray per the mother's report and had to be eased down to the ground.  He then became tied across the muscles of his chest his teeth began to chatter and his eyes rolled back in his head.  The mom reports that after the episode roughly 15 to 20 minutes later he recovered to his normal level of mentation and was interactive but slightly somnolent.  He then had 2 more episodes after the initial episode.  Each was baseline roughly 30 minutes apart.  And with each episode he had similar type actions.  He has now been brought in by EMS for further evaluation.  He is awake and alert currently mildly somnolent but answers questions appropriate but does not remember the episodes themselves.  No previous history of seizure.  He does not take any seizure medication.  He has only had 1 similar episode in the past that occurred roughly 2 weeks ago.  He was evaluated by his primary care physician had no abnormalities and was initiated on propranolol.  No fever or infectious symptoms.  No upper respiratory congestion, sore throat, rhinorrhea.  No previous chest pain cough or shortness of breath.  No abdominal pain.  No change in bowel urinary function.  He denies any illicit drug use.  No other acute complaints.        Review of Systems   Constitutional: Negative for chills, fatigue and fever.   HENT: Negative for congestion, ear pain, postnasal drip, sinus pressure and sore throat.    Eyes: Negative for pain, redness and visual disturbance.    Respiratory: Negative for cough, chest tightness and shortness of breath.    Cardiovascular: Negative for chest pain, palpitations and leg swelling.   Gastrointestinal: Negative for abdominal pain, anal bleeding, blood in stool, diarrhea, nausea and vomiting.   Endocrine: Negative for polydipsia and polyuria.   Genitourinary: Negative for difficulty urinating, dysuria, frequency and urgency.   Musculoskeletal: Negative for arthralgias, back pain and neck pain.   Skin: Negative for pallor and rash.   Allergic/Immunologic: Negative for environmental allergies and immunocompromised state.   Neurological: Positive for tremors and syncope. Negative for dizziness, weakness and headaches.   Hematological: Negative for adenopathy.   Psychiatric/Behavioral: Positive for confusion and decreased concentration. Negative for self-injury and suicidal ideas. The patient is not nervous/anxious.    All other systems reviewed and are negative.      Past Medical History:   Diagnosis Date   • ADHD (attention deficit hyperactivity disorder)    • Depression    • PTSD (post-traumatic stress disorder)        Allergies   Allergen Reactions   • Liquimat Lotion [Elemental Sulfur] Itching       Past Surgical History:   Procedure Laterality Date   • HERNIA REPAIR         Family History   Problem Relation Age of Onset   • Heart attack Mother    • Obesity Mother    • Stroke Father    • Bipolar disorder Father    • Anxiety disorder Father    • Depression Father    • Seizures Sister    • Polycystic ovary syndrome Sister    • Thyroid disease Sister    • No Known Problems Sister    • Stroke Maternal Grandfather    • Diabetes Maternal Grandfather    • Kidney disease Maternal Grandfather    • Hypertension Paternal Grandmother    • Other Paternal Grandfather         unknown       Social History     Socioeconomic History   • Marital status: Single   Tobacco Use   • Smoking status: Some Days     Packs/day: 1.00     Years: 0.50     Pack years: 0.50      Types: Cigarettes   • Smokeless tobacco: Never   Vaping Use   • Vaping Use: Some days   • Devices: Disposable   Substance and Sexual Activity   • Alcohol use: Never   • Drug use: Yes     Types: Marijuana     Comment: has cut out lately   • Sexual activity: Yes     Partners: Female     Birth control/protection: Condom     Comment: 4 female partners in the last year           Objective   Physical Exam  Vitals and nursing note reviewed.   Constitutional:       General: He is not in acute distress.     Appearance: Normal appearance. He is well-developed. He is not toxic-appearing or diaphoretic.   HENT:      Head: Normocephalic and atraumatic.      Right Ear: External ear normal.      Left Ear: External ear normal.      Nose: Nose normal.   Eyes:      General: Lids are normal.      Pupils: Pupils are equal, round, and reactive to light.   Neck:      Trachea: No tracheal deviation.   Cardiovascular:      Rate and Rhythm: Regular rhythm. Tachycardia present.      Pulses: No decreased pulses.      Heart sounds: Normal heart sounds. No murmur heard.    No friction rub. No gallop.   Pulmonary:      Effort: Pulmonary effort is normal. No respiratory distress.      Breath sounds: Normal breath sounds. No decreased breath sounds, wheezing, rhonchi or rales.   Abdominal:      General: Bowel sounds are normal.      Palpations: Abdomen is soft.      Tenderness: There is no abdominal tenderness. There is no guarding or rebound.   Musculoskeletal:         General: No deformity. Normal range of motion.      Cervical back: Normal range of motion and neck supple.   Lymphadenopathy:      Cervical: No cervical adenopathy.   Skin:     General: Skin is warm and dry.      Findings: No rash.   Neurological:      Mental Status: He is oriented to person, place, and time. He is lethargic.      GCS: GCS eye subscore is 4. GCS verbal subscore is 5. GCS motor subscore is 6.      Cranial Nerves: No cranial nerve deficit.      Sensory: No sensory  deficit.   Psychiatric:         Speech: Speech normal.         Behavior: Behavior normal.         Thought Content: Thought content normal.         Judgment: Judgment normal.         Procedures           ED Course                                           Medical Decision Making  Differential diagnosis includes seizure, illicit drug use, transient alteration of awareness, TIA, stroke, sepsis, electrolyte abnormality, conversion disorder, other unspecified etiology    Lab evaluation shows positive marijuana which the patient admits to.  Urine that does not show infection.  Mildly elevated ethanol level.  CMP with no significant electrolyte abnormalities and relatively normal kidney function.    CBC with normal white count H&H and platelet count.    CT scan of the head without contrast shows no acute abnormalities.    The patient has been observed here in the ER for several hours and has remained stable.    Discussed the patient's presentation with the on-call neurologist, Dr. Gates.  He does not recommend admission.    He recommends initiation of Keppra 500 mg currently and discharged with prescription for Keppra 500 mg twice daily and outpatient follow-up.    I discussed this with the patient and he is agreeable and desires to go home.    Altered mental status, unspecified altered mental status type: acute illness or injury  Convulsions, unspecified convulsion type: acute illness or injury  Transient alteration of awareness: acute illness or injury  Amount and/or Complexity of Data Reviewed  Independent Historian: parent  External Data Reviewed: labs, radiology, ECG and notes.  Labs: ordered. Decision-making details documented in ED Course.  Radiology: ordered and independent interpretation performed. Decision-making details documented in ED Course.  ECG/medicine tests: ordered and independent interpretation performed. Decision-making details documented in ED Course.      Risk  Prescription drug  management.          Final diagnoses:   Altered mental status, unspecified altered mental status type   Transient alteration of awareness   Convulsions, unspecified convulsion type       ED Disposition  ED Disposition     ED Disposition   Discharge    Condition   Stable    Comment   --             Ruben Sharma MD  [address]  [address]  [place] [id number] [phone]    In 1 week      Duong Gates MD  [address]  [place] [address] [phone]    Schedule an appointment as soon as possible for a visit            Medication List      New Prescriptions    levETIRAcetam 500 MG tablet  Commonly known as: KEPPRA  Take 1 tablet by mouth 2 (Two) Times a Day for 30 days.           Where to Get Your Medications      These medications were sent to [id number] - [place], [place] - [address]  AT [address] [facility] - [phone]  - [phone] FX  [address] , [facility] [address]    Phone: [phone]   · levETIRAcetam 500 MG tablet          Gita Wright MD  04/30/23 1540

## 2023-04-30 NOTE — DISCHARGE INSTRUCTIONS
Chris Chiu  is a 60 y.o. male.   He is here for check up and RHM.     History of Present Illness   DMII - CMP/FLP and HBA1C checked today. I will call Chris  with results.He  is taking metformin without problem.He  is on an ARB. B/P is well controlled.  Cholesterol is well controlled.He  is on statins.  Foot exam today is unconcerning.  Eye exam is UTD.  Dental exam UTD.  I have advised regular exercise and reduced carbohydrates in diet.     Chris has chronic hypertension and has been well controlled on current medications.He  is monitored by me every 6 months and is here today for follow up. He is tolerating medications without side effect. He reports no vision changes, headaches or lightheadedness. He is requesting refills of medications.    He has chronic hyperlipidemia and his here today for regular 6 month monitoring of response to therapy. Lab work will be checked today. He is tolerating medications well without side effects.He is taking pravastatin 40 mg a day.    The following portions of the patient's history were reviewed and updated as appropriate: allergies, current medications, past family history, past medical history, past social history, past surgical history and problem list.    He is on a CPAP and using regularly.    PSA:folloed by urology  Colon Cancer Screening:  Lung Cancer Screening:    Review of Systems   Constitutional: Negative.    HENT: Negative.    Eyes: Negative.    Respiratory: Negative.    Cardiovascular: Negative.    Gastrointestinal: Negative.    Endocrine: Negative.    Genitourinary: Negative.    Musculoskeletal: Negative.   Skin: Negative.    Allergic/Immunologic: Negative.    Neurological: Negative.    Hematological: Negative.    Psychiatric/Behavioral: Negative.    I have reviewed the ROS as documented by the MA. Heron Borja MD          Vitals:    12/02/22 0858   BP: 132/78   Pulse: 82   Resp: 18   SpO2: 97%   Weight: 93.4 kg (206 lb)     Physical Exam  Follow-up with neurology on outpatient basis.    Take Keppra twice daily as prescribed.    Return to the ER with any further concern.     Constitutional: He is oriented to person, place, and time. He appears well-developed and well-nourished.   HENT:   Head: Normocephalic and atraumatic.   Eyes: Conjunctivae and EOM are normal. Pupils are equal, round, and reactive to light.   Neck: Normal range of motion. Neck supple. No thyromegaly present.   Cardiovascular: Normal rate, regular rhythm, normal heart sounds and intact distal pulses.  Exam reveals no gallop and no friction rub.    No murmur heard.  Pulmonary/Chest: Effort normal and breath sounds normal. No respiratory distress. He has no wheezes. He has no rales. He exhibits no tenderness.   Abdominal: Soft, non-tender, non-distended.   Musculoskeletal: Upper and Lower extremities have normal range of motion. He exhibits no edema.   Neurological: He is alert and oriented to person, place, and time.   Skin: Skin is warm and dry. No rash noted.   Psychiatric: He has a normal mood and affect. His behavior is normal. Judgment and thought content normal.   Nursing note and vitals reviewed.    Assessment    Chris Chiu was seen today for annual exam.  Problem List Items Addressed This Visit        Cardiac and Vasculature    Hyperlipidemia    Overview     He has been well controlled in the past on current medications pravastatin 20 mg a day.  Labs have been ordered to evaluate his continued response to therapy.  I will notify him of results and make adjustments to medications as needed.         Relevant Medications    pravastatin (PRAVACHOL) 40 MG tablet    Essential hypertension - Primary    Overview     He has been taking losartan 25 mg and amlodipine 5 mg as prescribed but his blood pressure has not been as well controlled as it needs to be.  Today we have increased his losartan to 50 mg a day and he will continue amlodipine 5 mg a day.  I have asked him to follow-up with a nurse visit for blood pressure check in 2 weeks.         Relevant Medications    amLODIPine (NORVASC) 5 MG tablet    losartan  (COZAAR) 50 MG tablet       Endocrine and Metabolic    Controlled type 2 diabetes mellitus without complication, without long-term current use of insulin (HCC)    Overview     He is doing well on Metformin 500 mg once a day and he is working on diet and exercise.  He is on appropriate medication for his blood pressure and is on cholesterol medication.  We will check labs today to evaluate his response to therapy.         Relevant Medications    metFORMIN ER (GLUCOPHAGE-XR) 500 MG 24 hr tablet       Health Encounters    Preventative health care       Sleep    KARUNA on CPAP     Orders Placed This Encounter   Procedures   • CBC (No Diff)   • Comprehensive Metabolic Panel   • Lipid Panel With / Chol / HDL Ratio   • Hemoglobin A1c   • Microalbumin / Creatinine Urine Ratio - Urine, Clean Catch            Return in about 6 months (around 6/2/2023).

## 2023-05-01 LAB
QT INTERVAL: 322 MS
QTC INTERVAL: 445 MS

## 2023-05-23 ENCOUNTER — TELEPHONE (OUTPATIENT)
Dept: FAMILY MEDICINE CLINIC | Facility: CLINIC | Age: 23
End: 2023-05-23

## 2023-05-30 RX ORDER — LEVETIRACETAM 500 MG/1
500 TABLET ORAL 2 TIMES DAILY
Qty: 60 TABLET | Refills: 0 | Status: SHIPPED | OUTPATIENT
Start: 2023-05-30 | End: 2023-06-29

## 2023-05-30 NOTE — TELEPHONE ENCOUNTER
Caller: jacqui kumari    Relationship: Mother    Best call back number: [phone]    Requested Prescriptions:   Requested Prescriptions     Pending Prescriptions Disp Refills   • levETIRAcetam (KEPPRA) 500 MG tablet 60 tablet 0     Sig: Take 1 tablet by mouth 2 (Two) Times a Day for 30 days.        Pharmacy where request should be sent: [id number] - [facility] [facility] [address]  AT [address] [facility] [phone] [facility] [phone]      Last office visit with prescribing clinician: 4/14/2023   Last telemedicine visit with prescribing clinician: Visit date not found   Next office visit with prescribing clinician: 7/12/2023     Additional details provided by patient:    Does the patient have less than a 3 day supply:  [x] Yes  [] No    Would you like a call back once the refill request has been completed: [] Yes [x] No    If the office needs to give you a call back, can they leave a voicemail: [] Yes [x] No    Yunior Vazquez Rep   05/30/23 10:29 EDT

## 2023-09-05 ENCOUNTER — TELEPHONE (OUTPATIENT)
Dept: PSYCHIATRY | Facility: CLINIC | Age: 23
End: 2023-09-05

## 2023-09-05 DIAGNOSIS — F31.9 BIPOLAR I DISORDER WITH DEPRESSION: Primary | ICD-10-CM

## 2023-09-05 RX ORDER — ARIPIPRAZOLE 10 MG/1
10 TABLET ORAL DAILY
Qty: 30 TABLET | Refills: 2 | Status: SHIPPED | OUTPATIENT
Start: 2023-09-05

## 2023-09-05 NOTE — TELEPHONE ENCOUNTER
Patient will not have any insurance until Oct 1, 2023 and he would like to switch his medication to something he can afford.  Patient can't use a copay card for this medication because he doesn't have any insurance at all.  Patient doesn't want to wait on helping hands to be approved because he will be out of medication.  Please advise.

## 2023-10-11 ENCOUNTER — TELEMEDICINE (OUTPATIENT)
Dept: PSYCHIATRY | Facility: CLINIC | Age: 23
End: 2023-10-11
Payer: MEDICAID

## 2023-10-11 DIAGNOSIS — F33.1 MODERATE EPISODE OF RECURRENT MAJOR DEPRESSIVE DISORDER: ICD-10-CM

## 2023-10-11 DIAGNOSIS — F41.1 GAD (GENERALIZED ANXIETY DISORDER): ICD-10-CM

## 2023-10-11 DIAGNOSIS — F43.10 PTSD (POST-TRAUMATIC STRESS DISORDER): ICD-10-CM

## 2023-10-11 DIAGNOSIS — F51.04 PSYCHOPHYSIOLOGICAL INSOMNIA: ICD-10-CM

## 2023-10-11 DIAGNOSIS — F31.9 BIPOLAR I DISORDER WITH DEPRESSION: Primary | ICD-10-CM

## 2023-10-11 DIAGNOSIS — F90.2 ATTENTION DEFICIT HYPERACTIVITY DISORDER (ADHD), COMBINED TYPE: ICD-10-CM

## 2023-10-11 PROCEDURE — 1160F RVW MEDS BY RX/DR IN RCRD: CPT | Performed by: NURSE PRACTITIONER

## 2023-10-11 PROCEDURE — 99214 OFFICE O/P EST MOD 30 MIN: CPT | Performed by: NURSE PRACTITIONER

## 2023-10-11 PROCEDURE — 1159F MED LIST DOCD IN RCRD: CPT | Performed by: NURSE PRACTITIONER

## 2023-10-11 RX ORDER — ARIPIPRAZOLE 20 MG/1
20 TABLET ORAL DAILY
Qty: 30 TABLET | Refills: 11 | Status: SHIPPED | OUTPATIENT
Start: 2023-10-11

## 2023-10-11 RX ORDER — DOXEPIN HYDROCHLORIDE 10 MG/1
10 CAPSULE ORAL NIGHTLY
Qty: 30 CAPSULE | Refills: 11 | Status: SHIPPED | OUTPATIENT
Start: 2023-10-11

## 2023-10-11 RX ORDER — BUSPIRONE HYDROCHLORIDE 10 MG/1
10 TABLET ORAL 2 TIMES DAILY
Qty: 60 TABLET | Refills: 11 | Status: SHIPPED | OUTPATIENT
Start: 2023-10-11

## 2023-10-11 NOTE — PROGRESS NOTES
Patient Name: Toyin Workman  MRN: [id number]   :  2000     This provider is located at her home office through the [facility] (through [facility]), [address], [address] using a secure [employer] Video Visit through [employer]. Patient is being seen remotely via telehealth at their home address in [place], and stated they are in a secure environment for this session. The patient's condition being diagnosed/treated is appropriate for telemedicine. The provider identified herself as well as her credentials.   The patient, and/or patients guardian, consent to be seen remotely, and when consent is given they understand that the consent allows for patient identifiable information to be sent to a third party as needed.   They may refuse to be seen remotely at any time. The electronic data is encrypted and password protected, and the patient and/or guardian has been advised of the potential risks to privacy not withstanding such measures.    You have chosen to receive care through a telehealth visit.  Do you consent to use a video/audio connection for your medical care today? Yes    Chief Complaint:      ICD-10-CM ICD-9-CM   1. Bipolar I disorder with depression  F31.9 296.50   2. PTSD (post-traumatic stress disorder)  F43.10 309.81   3. Moderate episode of recurrent major depressive disorder  F33.1 296.32   4. Attention deficit hyperactivity disorder (ADHD), combined type  F90.2 314.01   5. Psychophysiological insomnia  F51.04 307.42   6. FERN (generalized anxiety disorder)  F41.1 300.02       History of Present Illness: Toyin Workman is a 23 y.o. male is here today for medication management follow up.  Patient's anxiety has been bad.  Causing nausea.  Working at [employer] from 2-10.  This is very hard work.    The following portions of the patient's history were reviewed and updated as appropriate: allergies, current medications, past family history, past medical  history, past social history, past surgical history, and problem list.    Review of Systems;;  Review of Systems   Constitutional:  Negative for activity change, appetite change, fatigue, unexpected weight gain and unexpected weight loss.   Respiratory:  Negative for shortness of breath and wheezing.    Gastrointestinal:  Negative for constipation, diarrhea, nausea and vomiting.   Musculoskeletal:  Negative for gait problem.   Skin:  Negative for dry skin and rash.   Neurological:  Negative for dizziness, speech difficulty, weakness, light-headedness, headache, memory problem and confusion.   Psychiatric/Behavioral:  Positive for stress. Negative for agitation, behavioral problems, decreased concentration, dysphoric mood, hallucinations, self-injury, sleep disturbance, suicidal ideas, negative for hyperactivity and depressed mood. The patient is nervous/anxious.        Physical Exam;;  Physical Exam  Constitutional:       General: He is not in acute distress.     Appearance: He is well-developed. He is not diaphoretic.   HENT:      Head: Normocephalic and atraumatic.   Eyes:      Conjunctiva/sclera: Conjunctivae normal.   Pulmonary:      Effort: Pulmonary effort is normal. No respiratory distress.   Musculoskeletal:         General: Normal range of motion.      Cervical back: Full passive range of motion without pain and normal range of motion.   Neurological:      Mental Status: He is alert and oriented to person, place, and time.   Psychiatric:         Mood and Affect: Mood is anxious. Mood is not depressed. Affect is not labile, blunt, angry or inappropriate.         Speech: Speech is not rapid and pressured or tangential.         Behavior: Behavior normal. Behavior is not agitated, slowed, aggressive, withdrawn, hyperactive or combative. Behavior is cooperative.         Thought Content: Thought content normal. Thought content is not paranoid or delusional. Thought content does not include homicidal or suicidal  ideation. Thought content does not include homicidal or suicidal plan.         Judgment: Judgment normal.       There were no vitals taken for this visit.  There is no height or weight on file to calculate BMI. Video appt unable to obtain.     Current Medications;;    Current Outpatient Medications:     doxepin (SINEquan) 10 MG capsule, Take 1 capsule by mouth Every Night., Disp: 30 capsule, Rfl: 11    ARIPiprazole (ABILIFY) 20 MG tablet, Take 1 tablet by mouth Daily., Disp: 30 tablet, Rfl: 11    buPROPion XL (WELLBUTRIN XL) 150 MG 24 hr tablet, Take 1 tablet by mouth Daily., Disp: 30 tablet, Rfl: 6    busPIRone (BUSPAR) 10 MG tablet, Take 1 tablet by mouth 2 (Two) Times a Day., Disp: 60 tablet, Rfl: 11    levETIRAcetam (KEPPRA) 500 MG tablet, TAKE 1 TABLET BY MOUTH TWICE DAILY, Disp: 180 tablet, Rfl: 2    propranolol (INDERAL) 10 MG tablet, Take 1 tablet by mouth 2 (Two) Times a Day., Disp: 60 tablet, Rfl: 2    Lab Results:   No visits with results within 3 Month(s) from this visit.   Latest known visit with results is:   Admission on 04/29/2023, Discharged on 04/29/2023   Component Date Value Ref Range Status    QT Interval 04/29/2023 322  ms Final    QTC Interval 04/29/2023 445  ms Final    Glucose 04/29/2023 95  65 - 99 mg/dL Final    BUN 04/29/2023 12  6 - 20 mg/dL Final    Creatinine 04/29/2023 1.35 (H)  0.76 - 1.27 mg/dL Final    Sodium 04/29/2023 141  136 - 145 mmol/L Final    Potassium 04/29/2023 3.7  3.5 - 5.2 mmol/L Final    Slight hemolysis detected by analyzer. Results may be affected.    Chloride 04/29/2023 106  98 - 107 mmol/L Final    CO2 04/29/2023 22.0  22.0 - 29.0 mmol/L Final    Calcium 04/29/2023 8.9  8.6 - 10.5 mg/dL Final    Total Protein 04/29/2023 7.0  6.0 - 8.5 g/dL Final    Albumin 04/29/2023 4.3  3.5 - 5.2 g/dL Final    ALT (SGPT) 04/29/2023 19  1 - 41 U/L Final    AST (SGOT) 04/29/2023 19  1 - 40 U/L Final    Alkaline Phosphatase 04/29/2023 63  39 - 117 U/L Final    Total Bilirubin  04/29/2023 0.4  0.0 - 1.2 mg/dL Final    Globulin 04/29/2023 2.7  gm/dL Final    Calculated Result    A/G Ratio 04/29/2023 1.6  g/dL Final    BUN/Creatinine Ratio 04/29/2023 8.9  7.0 - 25.0 Final    Anion Gap 04/29/2023 13.0  5.0 - 15.0 mmol/L Final    eGFR 04/29/2023 77.1  >60.0 mL/min/1.73 Final    Color, UA 04/29/2023 Yellow  Yellow, Straw Final    Appearance, UA 04/29/2023 Clear  Clear Final    pH, UA 04/29/2023 6.0  5.0 - 8.0 Final    Specific Gravity, UA 04/29/2023 1.025  1.001 - 1.030 Final    Glucose, UA 04/29/2023 Negative  Negative Final    Ketones, UA 04/29/2023 Trace (A)  Negative Final    Bilirubin, UA 04/29/2023 Negative  Negative Final    Blood, UA 04/29/2023 Negative  Negative Final    Protein, UA 04/29/2023 30 mg/dL (1+) (A)  Negative Final    Leuk Esterase, UA 04/29/2023 Negative  Negative Final    Nitrite, UA 04/29/2023 Negative  Negative Final    Urobilinogen, UA 04/29/2023 1.0 E.U./dL  0.2 - 1.0 E.U./dL Final    Extra Tube 04/29/2023 Hold for add-ons.   Final    Auto resulted.    Extra Tube 04/29/2023 hold for add-on   Final    Auto resulted    Extra Tube 04/29/2023 Hold for add-ons.   Final    Auto resulted.    Extra Tube 04/29/2023 Hold for add-ons.   Final    Auto resulted.    Extra Tube 04/29/2023 Hold for add-ons.   Final    Auto resulted    WBC 04/29/2023 10.48  3.40 - 10.80 10*3/mm3 Final    RBC 04/29/2023 4.64  4.14 - 5.80 10*6/mm3 Final    Hemoglobin 04/29/2023 13.4  13.0 - 17.7 g/dL Final    Hematocrit 04/29/2023 40.6  37.5 - 51.0 % Final    MCV 04/29/2023 87.5  79.0 - 97.0 fL Final    MCH 04/29/2023 28.9  26.6 - 33.0 pg Final    MCHC 04/29/2023 33.0  31.5 - 35.7 g/dL Final    RDW 04/29/2023 13.6  12.3 - 15.4 % Final    RDW-SD 04/29/2023 43.3  37.0 - 54.0 fl Final    MPV 04/29/2023 9.4  6.0 - 12.0 fL Final    Platelets 04/29/2023 318  140 - 450 10*3/mm3 Final    Neutrophil % 04/29/2023 56.6  42.7 - 76.0 % Final    Lymphocyte % 04/29/2023 29.7  19.6 - 45.3 % Final    Monocyte %  04/29/2023 6.9  5.0 - 12.0 % Final    Eosinophil % 04/29/2023 5.8  0.3 - 6.2 % Final    Basophil % 04/29/2023 0.5  0.0 - 1.5 % Final    Immature Grans % 04/29/2023 0.5  0.0 - 0.5 % Final    Neutrophils, Absolute 04/29/2023 5.94  1.70 - 7.00 10*3/mm3 Final    Lymphocytes, Absolute 04/29/2023 3.11 (H)  0.70 - 3.10 10*3/mm3 Final    Monocytes, Absolute 04/29/2023 0.72  0.10 - 0.90 10*3/mm3 Final    Eosinophils, Absolute 04/29/2023 0.61 (H)  0.00 - 0.40 10*3/mm3 Final    Basophils, Absolute 04/29/2023 0.05  0.00 - 0.20 10*3/mm3 Final    Immature Grans, Absolute 04/29/2023 0.05  0.00 - 0.05 10*3/mm3 Final    nRBC 04/29/2023 0.0  0.0 - 0.2 /100 WBC Final    Glucose 04/29/2023 95  70 - 130 mg/dL Final    Meter: [id number] : [id number] Collier Trista    RBC, UA 04/29/2023 0-2  None Seen, 0-2 /HPF Final    WBC, UA 04/29/2023 0-2  None Seen, 0-2 /HPF Final    Bacteria, UA 04/29/2023 None Seen  None Seen, Trace /HPF Final    Squamous Epithelial Cells, UA 04/29/2023 0-2  None Seen, 0-2 /HPF Final    Hyaline Casts, UA 04/29/2023 0-6  0 - 6 /LPF Final    Methodology 04/29/2023 Automated Microscopy   Final    Ethanol 04/29/2023 22 (H)  0 - 10 mg/dL Final    THC, Screen, Urine 04/29/2023 Positive (A)  Negative Final    Phencyclidine (PCP), Urine 04/29/2023 Negative  Negative Final    Cocaine Screen, Urine 04/29/2023 Negative  Negative Final    Methamphetamine, Ur 04/29/2023 Negative  Negative Final    Opiate Screen 04/29/2023 Negative  Negative Final    Amphetamine Screen, Urine 04/29/2023 Negative  Negative Final    Benzodiazepine Screen, Urine 04/29/2023 Negative  Negative Final    Tricyclic Antidepressants Screen 04/29/2023 Negative  Negative Final    Methadone Screen, Urine 04/29/2023 Negative  Negative Final    Barbiturates Screen, Urine 04/29/2023 Negative  Negative Final    Oxycodone Screen, Urine 04/29/2023 Negative  Negative Final    Propoxyphene Screen 04/29/2023 Negative  Negative Final    Buprenorphine, Screen, Urine  04/29/2023 Negative  Negative Final       Mental Status Exam:   Hygiene:   good  Cooperation:  Cooperative  Eye Contact:  Good  Psychomotor Behavior:  Appropriate  Mood:anxious  Affect:  Appropriate  Hopelessness: Denies  Speech:  Normal  Thought Process:  Goal directed  Thought Content:  Normal  Suicidal:  None  Homicidal:  None  Hallucinations:  None  Delusion:  None  Memory:  Intact  Orientation:  Person, Place, Time, and Situation  Reliability:  good  Insight:  Good  Judgement:  Good  Impulse Control:  Good    PHQ-9 Depression Screening  Little interest or pleasure in doing things?     Feeling down, depressed, or hopeless?     Trouble falling or staying asleep, or sleeping too much?     Feeling tired or having little energy?     Poor appetite or overeating?     Feeling bad about yourself - or that you are a failure or have let yourself or your family down?     Trouble concentrating on things, such as reading the newspaper or watching television?     Moving or speaking so slowly that other people could have noticed? Or the opposite - being so fidgety or restless that you have been moving around a lot more than usual?     Thoughts that you would be better off dead, or of hurting yourself in some way?     PHQ-9 Total Score     If you checked off any problems, how difficult have these problems made it for you to do your work, take care of things at home, or get along with other people?          Assessment/Plan:  Diagnoses and all orders for this visit:    1. Bipolar I disorder with depression (Primary)  -     ARIPiprazole (ABILIFY) 20 MG tablet; Take 1 tablet by mouth Daily.  Dispense: 30 tablet; Refill: 11    2. PTSD (post-traumatic stress disorder)    3. Moderate episode of recurrent major depressive disorder    4. Attention deficit hyperactivity disorder (ADHD), combined type    5. Psychophysiological insomnia  -     doxepin (SINEquan) 10 MG capsule; Take 1 capsule by mouth Every Night.  Dispense: 30 capsule;  Refill: 11    6. FERN (generalized anxiety disorder)  -     busPIRone (BUSPAR) 10 MG tablet; Take 1 tablet by mouth 2 (Two) Times a Day.  Dispense: 60 tablet; Refill: 11      Due to increase in anxiety we will start BuSpar 10 mg 2 times a day.  We will also increase Abilify to 20 mg daily.  Patient still does not have insurance however Medicaid is pending.  Patient will call to make a follow-up when Medicaid is active.    A psychological evaluation was conducted in order to assess past and current level of functioning. Areas assessed included, but were not limited to: perception of social support, perception of ability to face and deal with challenges in life (positive functioning), anxiety symptoms, depressive symptoms, perspective on beliefs/belief system, coping skills for stress, intelligence level,  Therapeutic rapport was established. Interventions conducted today were geared towards incorporating medication management along with support for continued therapy. Education was also provided as to the med management with this provider and what to expect in subsequent sessions.    We discussed risks, benefits,goals and side effects of the above medication and the patient was agreeable with the plan.Patient was educated on the importance of compliance with treatment and follow-up appointments. Patient is aware to contact the [facility] [phone] with any worsening of symptoms. To call for questions or concerns and return early if necessary. Patent is agreeable to go to the Emergency Department or call 911 should they begin SI/HI.     Part of this note may be an electronic transcription/translation of spoken language to printed text using the Dragon Dictation System.    Return in about 3 months (around 1/11/2024) for Follow Up 30 min, Video visit.    GLENNY Rosas

## 2024-09-20 DIAGNOSIS — F41.1 GAD (GENERALIZED ANXIETY DISORDER): ICD-10-CM

## 2024-09-20 DIAGNOSIS — F31.9 BIPOLAR I DISORDER WITH DEPRESSION: ICD-10-CM

## 2024-09-23 RX ORDER — ARIPIPRAZOLE 20 MG/1
20 TABLET ORAL DAILY
Qty: 30 TABLET | Refills: 11 | OUTPATIENT
Start: 2024-09-23

## 2024-09-23 RX ORDER — BUSPIRONE HYDROCHLORIDE 10 MG/1
10 TABLET ORAL 2 TIMES DAILY
Qty: 60 TABLET | Refills: 11 | OUTPATIENT
Start: 2024-09-23

## 2024-10-01 DIAGNOSIS — F31.9 BIPOLAR I DISORDER WITH DEPRESSION: ICD-10-CM

## 2024-10-01 DIAGNOSIS — F51.04 PSYCHOPHYSIOLOGICAL INSOMNIA: ICD-10-CM

## 2024-10-01 DIAGNOSIS — F41.1 GAD (GENERALIZED ANXIETY DISORDER): ICD-10-CM

## 2024-10-01 RX ORDER — DOXEPIN HYDROCHLORIDE 10 MG/1
10 CAPSULE ORAL NIGHTLY
Qty: 30 CAPSULE | Refills: 11 | Status: SHIPPED | OUTPATIENT
Start: 2024-10-01

## 2024-10-01 RX ORDER — BUSPIRONE HYDROCHLORIDE 10 MG/1
10 TABLET ORAL 2 TIMES DAILY
Qty: 60 TABLET | Refills: 11 | Status: SHIPPED | OUTPATIENT
Start: 2024-10-01

## 2024-10-01 RX ORDER — ARIPIPRAZOLE 20 MG/1
20 TABLET ORAL DAILY
Qty: 30 TABLET | Refills: 11 | Status: SHIPPED | OUTPATIENT
Start: 2024-10-01

## 2024-10-01 NOTE — TELEPHONE ENCOUNTER
Pt called in to schedule with provider.  When updating information the pt had just moved to [place].  Made pt aware that the provider will not be able to do an appt with him due to her only practicing in [place] and [place]. Pt is requesting a refill to be sent to his [place] [facility] enough to do him until his Nov appt.   Please Advise.